# Patient Record
Sex: MALE | Race: WHITE
[De-identification: names, ages, dates, MRNs, and addresses within clinical notes are randomized per-mention and may not be internally consistent; named-entity substitution may affect disease eponyms.]

---

## 2018-09-14 ENCOUNTER — TRANSCRIPTION ENCOUNTER (OUTPATIENT)
Age: 58
End: 2018-09-14

## 2019-02-23 ENCOUNTER — OUTPATIENT (OUTPATIENT)
Dept: OUTPATIENT SERVICES | Facility: HOSPITAL | Age: 59
LOS: 1 days | Discharge: HOME | End: 2019-02-23

## 2019-02-23 DIAGNOSIS — D51.9 VITAMIN B12 DEFICIENCY ANEMIA, UNSPECIFIED: ICD-10-CM

## 2019-02-23 DIAGNOSIS — E13.9 OTHER SPECIFIED DIABETES MELLITUS WITHOUT COMPLICATIONS: ICD-10-CM

## 2019-02-23 DIAGNOSIS — Z00.00 ENCOUNTER FOR GENERAL ADULT MEDICAL EXAMINATION WITHOUT ABNORMAL FINDINGS: ICD-10-CM

## 2019-02-23 DIAGNOSIS — E78.5 HYPERLIPIDEMIA, UNSPECIFIED: ICD-10-CM

## 2019-02-23 DIAGNOSIS — R94.6 ABNORMAL RESULTS OF THYROID FUNCTION STUDIES: ICD-10-CM

## 2019-02-23 DIAGNOSIS — E55.9 VITAMIN D DEFICIENCY, UNSPECIFIED: ICD-10-CM

## 2019-03-13 PROBLEM — Z00.00 ENCOUNTER FOR PREVENTIVE HEALTH EXAMINATION: Status: ACTIVE | Noted: 2019-03-13

## 2019-03-15 ENCOUNTER — APPOINTMENT (OUTPATIENT)
Dept: CARDIOLOGY | Facility: CLINIC | Age: 59
End: 2019-03-15

## 2021-09-27 ENCOUNTER — APPOINTMENT (OUTPATIENT)
Dept: CARDIOLOGY | Facility: CLINIC | Age: 61
End: 2021-09-27

## 2021-10-24 ENCOUNTER — FORM ENCOUNTER (OUTPATIENT)
Age: 61
End: 2021-10-24

## 2021-10-31 ENCOUNTER — FORM ENCOUNTER (OUTPATIENT)
Age: 61
End: 2021-10-31

## 2021-11-01 ENCOUNTER — OUTPATIENT (OUTPATIENT)
Dept: OUTPATIENT SERVICES | Facility: HOSPITAL | Age: 61
LOS: 1 days | Discharge: HOME | End: 2021-11-01

## 2021-11-01 DIAGNOSIS — Z11.59 ENCOUNTER FOR SCREENING FOR OTHER VIRAL DISEASES: ICD-10-CM

## 2021-11-03 ENCOUNTER — FORM ENCOUNTER (OUTPATIENT)
Age: 61
End: 2021-11-03

## 2021-11-04 ENCOUNTER — RESULT REVIEW (OUTPATIENT)
Age: 61
End: 2021-11-04

## 2021-11-04 ENCOUNTER — INPATIENT (INPATIENT)
Facility: HOSPITAL | Age: 61
LOS: 4 days | Discharge: HOME HEALTH | End: 2021-11-09
Attending: INTERNAL MEDICINE | Admitting: INTERNAL MEDICINE
Payer: MEDICAID

## 2021-11-04 VITALS
SYSTOLIC BLOOD PRESSURE: 151 MMHG | OXYGEN SATURATION: 98 % | DIASTOLIC BLOOD PRESSURE: 74 MMHG | RESPIRATION RATE: 18 BRPM | TEMPERATURE: 98 F | HEART RATE: 74 BPM

## 2021-11-04 DIAGNOSIS — Z98.890 OTHER SPECIFIED POSTPROCEDURAL STATES: Chronic | ICD-10-CM

## 2021-11-04 DIAGNOSIS — E87.2 ACIDOSIS: ICD-10-CM

## 2021-11-04 LAB
ALBUMIN SERPL ELPH-MCNC: 4.3 G/DL — SIGNIFICANT CHANGE UP (ref 3.5–5.2)
ALBUMIN SERPL ELPH-MCNC: 4.6 G/DL — SIGNIFICANT CHANGE UP (ref 3.5–5.2)
ALP SERPL-CCNC: 77 U/L — SIGNIFICANT CHANGE UP (ref 30–115)
ALP SERPL-CCNC: 83 U/L — SIGNIFICANT CHANGE UP (ref 30–115)
ALT FLD-CCNC: 17 U/L — SIGNIFICANT CHANGE UP (ref 0–41)
ALT FLD-CCNC: 20 U/L — SIGNIFICANT CHANGE UP (ref 0–41)
ANION GAP SERPL CALC-SCNC: 16 MMOL/L — HIGH (ref 7–14)
ANION GAP SERPL CALC-SCNC: 16 MMOL/L — HIGH (ref 7–14)
APPEARANCE UR: CLEAR — SIGNIFICANT CHANGE UP
APTT BLD: 31.9 SEC — SIGNIFICANT CHANGE UP (ref 27–39.2)
AST SERPL-CCNC: 15 U/L — SIGNIFICANT CHANGE UP (ref 0–41)
AST SERPL-CCNC: 19 U/L — SIGNIFICANT CHANGE UP (ref 0–41)
BILIRUB DIRECT SERPL-MCNC: 0.2 MG/DL — SIGNIFICANT CHANGE UP (ref 0–0.2)
BILIRUB INDIRECT FLD-MCNC: 0.4 MG/DL — SIGNIFICANT CHANGE UP (ref 0.2–1.2)
BILIRUB SERPL-MCNC: 0.6 MG/DL — SIGNIFICANT CHANGE UP (ref 0.2–1.2)
BILIRUB SERPL-MCNC: 0.6 MG/DL — SIGNIFICANT CHANGE UP (ref 0.2–1.2)
BILIRUB UR-MCNC: NEGATIVE — SIGNIFICANT CHANGE UP
BUN SERPL-MCNC: 21 MG/DL — HIGH (ref 10–20)
BUN SERPL-MCNC: 22 MG/DL — HIGH (ref 10–20)
CALCIUM SERPL-MCNC: 9.3 MG/DL — SIGNIFICANT CHANGE UP (ref 8.5–10.1)
CALCIUM SERPL-MCNC: 9.4 MG/DL — SIGNIFICANT CHANGE UP (ref 8.5–10.1)
CHLORIDE SERPL-SCNC: 103 MMOL/L — SIGNIFICANT CHANGE UP (ref 98–110)
CHLORIDE SERPL-SCNC: 104 MMOL/L — SIGNIFICANT CHANGE UP (ref 98–110)
CO2 SERPL-SCNC: 20 MMOL/L — SIGNIFICANT CHANGE UP (ref 17–32)
CO2 SERPL-SCNC: 21 MMOL/L — SIGNIFICANT CHANGE UP (ref 17–32)
COLOR SPEC: SIGNIFICANT CHANGE UP
CREAT SERPL-MCNC: 0.9 MG/DL — SIGNIFICANT CHANGE UP (ref 0.7–1.5)
CREAT SERPL-MCNC: 0.9 MG/DL — SIGNIFICANT CHANGE UP (ref 0.7–1.5)
DIFF PNL FLD: NEGATIVE — SIGNIFICANT CHANGE UP
GLUCOSE BLDC GLUCOMTR-MCNC: 164 MG/DL — HIGH (ref 70–99)
GLUCOSE SERPL-MCNC: 168 MG/DL — HIGH (ref 70–99)
GLUCOSE SERPL-MCNC: 178 MG/DL — HIGH (ref 70–99)
GLUCOSE UR QL: ABNORMAL
HCT VFR BLD CALC: 48.7 % — SIGNIFICANT CHANGE UP (ref 42–52)
HGB BLD-MCNC: 16.5 G/DL — SIGNIFICANT CHANGE UP (ref 14–18)
INR BLD: 1.03 RATIO — SIGNIFICANT CHANGE UP (ref 0.65–1.3)
KETONES UR-MCNC: NEGATIVE — SIGNIFICANT CHANGE UP
LEUKOCYTE ESTERASE UR-ACNC: NEGATIVE — SIGNIFICANT CHANGE UP
MAGNESIUM SERPL-MCNC: 2.2 MG/DL — SIGNIFICANT CHANGE UP (ref 1.8–2.4)
MCHC RBC-ENTMCNC: 29.7 PG — SIGNIFICANT CHANGE UP (ref 27–31)
MCHC RBC-ENTMCNC: 33.9 G/DL — SIGNIFICANT CHANGE UP (ref 32–37)
MCV RBC AUTO: 87.6 FL — SIGNIFICANT CHANGE UP (ref 80–94)
MRSA PCR RESULT.: NEGATIVE — SIGNIFICANT CHANGE UP
NITRITE UR-MCNC: NEGATIVE — SIGNIFICANT CHANGE UP
NRBC # BLD: 0 /100 WBCS — SIGNIFICANT CHANGE UP (ref 0–0)
NT-PROBNP SERPL-SCNC: 183 PG/ML — SIGNIFICANT CHANGE UP (ref 0–300)
PH UR: 5.5 — SIGNIFICANT CHANGE UP (ref 5–8)
PLATELET # BLD AUTO: 194 K/UL — SIGNIFICANT CHANGE UP (ref 130–400)
POTASSIUM SERPL-MCNC: 4.2 MMOL/L — SIGNIFICANT CHANGE UP (ref 3.5–5)
POTASSIUM SERPL-MCNC: 4.4 MMOL/L — SIGNIFICANT CHANGE UP (ref 3.5–5)
POTASSIUM SERPL-SCNC: 4.2 MMOL/L — SIGNIFICANT CHANGE UP (ref 3.5–5)
POTASSIUM SERPL-SCNC: 4.4 MMOL/L — SIGNIFICANT CHANGE UP (ref 3.5–5)
PROT SERPL-MCNC: 6.7 G/DL — SIGNIFICANT CHANGE UP (ref 6–8)
PROT SERPL-MCNC: 7.4 G/DL — SIGNIFICANT CHANGE UP (ref 6–8)
PROT UR-MCNC: NEGATIVE — SIGNIFICANT CHANGE UP
PROTHROM AB SERPL-ACNC: 11.8 SEC — SIGNIFICANT CHANGE UP (ref 9.95–12.87)
RAPID RVP RESULT: SIGNIFICANT CHANGE UP
RBC # BLD: 5.56 M/UL — SIGNIFICANT CHANGE UP (ref 4.7–6.1)
RBC # FLD: 11.9 % — SIGNIFICANT CHANGE UP (ref 11.5–14.5)
SARS-COV-2 RNA SPEC QL NAA+PROBE: SIGNIFICANT CHANGE UP
SODIUM SERPL-SCNC: 140 MMOL/L — SIGNIFICANT CHANGE UP (ref 135–146)
SODIUM SERPL-SCNC: 140 MMOL/L — SIGNIFICANT CHANGE UP (ref 135–146)
SP GR SPEC: 1.05 — HIGH (ref 1.01–1.03)
UROBILINOGEN FLD QL: SIGNIFICANT CHANGE UP
WBC # BLD: 8.77 K/UL — SIGNIFICANT CHANGE UP (ref 4.8–10.8)
WBC # FLD AUTO: 8.77 K/UL — SIGNIFICANT CHANGE UP (ref 4.8–10.8)

## 2021-11-04 PROCEDURE — 94060 EVALUATION OF WHEEZING: CPT | Mod: 26

## 2021-11-04 PROCEDURE — 71250 CT THORAX DX C-: CPT | Mod: 26,ME

## 2021-11-04 PROCEDURE — 93458 L HRT ARTERY/VENTRICLE ANGIO: CPT | Mod: 26

## 2021-11-04 PROCEDURE — 93306 TTE W/DOPPLER COMPLETE: CPT | Mod: 26

## 2021-11-04 PROCEDURE — 99221 1ST HOSP IP/OBS SF/LOW 40: CPT | Mod: 57

## 2021-11-04 PROCEDURE — 93880 EXTRACRANIAL BILAT STUDY: CPT | Mod: 26

## 2021-11-04 PROCEDURE — G1004: CPT

## 2021-11-04 RX ORDER — METFORMIN HYDROCHLORIDE 850 MG/1
0 TABLET ORAL
Qty: 0 | Refills: 0 | DISCHARGE

## 2021-11-04 RX ORDER — METOPROLOL TARTRATE 50 MG
12.5 TABLET ORAL EVERY 12 HOURS
Refills: 0 | Status: DISCONTINUED | OUTPATIENT
Start: 2021-11-04 | End: 2021-11-05

## 2021-11-04 RX ORDER — CHLORHEXIDINE GLUCONATE 213 G/1000ML
1 SOLUTION TOPICAL ONCE
Refills: 0 | Status: COMPLETED | OUTPATIENT
Start: 2021-11-04 | End: 2021-11-04

## 2021-11-04 RX ORDER — ATORVASTATIN CALCIUM 80 MG/1
1 TABLET, FILM COATED ORAL
Qty: 0 | Refills: 0 | DISCHARGE

## 2021-11-04 RX ORDER — ATORVASTATIN CALCIUM 80 MG/1
40 TABLET, FILM COATED ORAL DAILY
Refills: 0 | Status: DISCONTINUED | OUTPATIENT
Start: 2021-11-04 | End: 2021-11-05

## 2021-11-04 RX ORDER — HEPARIN SODIUM 5000 [USP'U]/ML
800 INJECTION INTRAVENOUS; SUBCUTANEOUS
Qty: 25000 | Refills: 0 | Status: DISCONTINUED | OUTPATIENT
Start: 2021-11-04 | End: 2021-11-05

## 2021-11-04 RX ORDER — CHLORHEXIDINE GLUCONATE 213 G/1000ML
15 SOLUTION TOPICAL ONCE
Refills: 0 | Status: COMPLETED | OUTPATIENT
Start: 2021-11-04 | End: 2021-11-05

## 2021-11-04 RX ORDER — SODIUM CHLORIDE 0.65 %
1 AEROSOL, SPRAY (ML) NASAL EVERY 12 HOURS
Refills: 0 | Status: DISCONTINUED | OUTPATIENT
Start: 2021-11-04 | End: 2021-11-05

## 2021-11-04 RX ADMIN — Medication 12.5 MILLIGRAM(S): at 17:07

## 2021-11-04 RX ADMIN — CHLORHEXIDINE GLUCONATE 1 APPLICATION(S): 213 SOLUTION TOPICAL at 22:00

## 2021-11-04 RX ADMIN — HEPARIN SODIUM 8 UNIT(S)/HR: 5000 INJECTION INTRAVENOUS; SUBCUTANEOUS at 18:45

## 2021-11-04 RX ADMIN — ATORVASTATIN CALCIUM 40 MILLIGRAM(S): 80 TABLET, FILM COATED ORAL at 22:44

## 2021-11-04 NOTE — CONSULT NOTE ADULT - TIME BILLING
CTS ATTENDING  pt interviewed and examined  case reviewed with Dr. JENNIFER Rene in the cath lab  angiogram reviewed and shows critical left main disease at the bifurcation with tight ostial circumflex lesion   LAD has a critical lesion as well as the first diagonal  RCA has tandem 99% lesions  there are suitable distal targets for bypass grafting  patien inform of the need for CAB   procedure explained in detail, including risks, benefits and alternatives, and agreed to proceed with CABG tomorrow  40 minutes of patient care as above were utilized to prepare the patient for major surgery  -FMR

## 2021-11-04 NOTE — CHART NOTE - NSCHARTNOTEFT_GEN_A_CORE
PRE-OP DIAGNOSIS:    Suspected CAD  Positive stress test:     PROCEDURE:     [x] Coronary Angiogram   [x] LHC   [x] LVG          PHYSICIAN:  Dr Rene    ASSISTANT:   Dr Dixon       Consent:    [x] Patient      Anesthesia:   [x] Sedation   [x] Local        Access & Closure: Right radial artery & D stat radial band for hemostasis    [x] Fr Radial Artery       IV Contrast:   45      mL        Intervention:   none    Implants: none       FINDINGS:     Coronary Dominance:  right    LM:   severe subtotal distal LM at the bifurcation into the LAD and CFX. It is a Segura 1-1-1 bifurcation  LAD: ostial and proximal LAD with severe 99% stenosis. Mid LAD severe disease.  distal LAD: luminal irregularities.  DG: Luminal irregularities.  CX: ostial/prox: Severe 99% stenosis in the proximal segment. dCFX: smallc caliber vessel. severe disease.  OM1: Large caliber vessel. calcified. has a severe 80% proximally and a 99% stenosis in its distal segment.  RCA: prox: Lunimal irregularities. Mid and distal RCA has severe diffuse 99% stenosis.  RPDA: lum Iregularities.       LVEDP:       EF:   55  %  by echo       ESTIMATED BLOOD LOSS: < 10 mL        CONDITION:   [x] Good        SPECIMEN REMOVED: N/A       POST-OP DIAGNOSIS:      [x] Mild Coronary Artery Disease (< 50% stenosis)   [x] Significant  3 Vessel Coronary Artery Disease (LAD, RCA, LCX) with severe distal LM disease  [x] AUC score: 7      [x] SYNTAX score:    37             PLAN OF CARE:   [] CT sx consult for possible CABG  [] Heart Team for CABG vs Multivessel PCI  [] IV Fluids:   1cc/kg/h x 4h (edp >18)  [] CT Surgery Consult

## 2021-11-04 NOTE — PRE-ANESTHESIA EVALUATION ADULT - NSANTHOSAYNRD_GEN_A_CORE
No. JANIS screening performed.  STOP BANG Legend: 0-2 = LOW Risk; 3-4 = INTERMEDIATE Risk; 5-8 = HIGH Risk

## 2021-11-04 NOTE — H&P CARDIOLOGY - HISTORY OF PRESENT ILLNESS
Patient is a 61y Male PMH: DM    Vital Signs Last 24 Hrs  T(C): --  T(F): --  HR: --  BP: --  BP(mean): --  RR: --  SpO2: --    Pre cath note:  indication:  [ ] STEMI                [ ] NSTEMI                 [ ] Acute coronary syndrome                   [ ]Unstable Angina   [ ] high risk  [ ] intermediate risk  [ ] low risk                   [x ] Stable Angina     non-invasive testing:           stress echo               Date:           10/25/21          result: [ x] high risk  [ ] intermediate risk  [ ] low risk  positive stress echo reveals hypokinesis apical anterior wall, apical septal wall, mid anterior wall, mid inferoseptal wall, basal anterior wall and basal inferoseptal wall    Anti- Anginal medications:                    [x ] not used                       [ ] used                   [ ] not used but strong indication not to use    Ejection Fraction                   [ ] <29            [ ] 30-39%   [ ] 40-49%     [ ]>50%    CHF                   [ ] active (within last 14 days on meds   [ ] Chronic (on meds but no exacerbation)    COPD                   [ ] mild (on chronic bronchodilators)  [ ] moderate (on chronic steroid therapy)      [ ] severe (indication for home O2 or PACO2 >50)    Other risk factors:                     [ ] Previous MI                     [ ] CVA/ stroke                    [ ] carotid stent/ CEA                    [ ] PVD/PAD- (arterial aneurysm, non-palpable pulses, tortuous vessel with inability to insert catheter, infra-renal dissection, renal or subclavian artery stenosis)                    [x ] diabetic                    [ ] previous CABG                    [ ] Renal Failure     Bleeding Risk:1.2%    REVIEW OF SYSTEMS:  CONSTITUTIONAL: No fever, weight loss, or fatigue  CARDIOLOGY: PAtient denies chest pain, shortness of breath or syncopal episodes.   RESPIRATORY: denies shortness of breath, wheezing  NEUROLOGICAL: NO weakness, no focal deficits to report.  ENDOCRINOLOGICAL: no recent change in diabetic medications.   GI: no BRBPR, no N,V,diarrhea.     PHYSICAL EXAM:  · CONSTITUTIONAL:	Well-developed, well nourished    ·RESPIRATORY:   airway patent; breath sounds equal; good air movement; respirations non-labored; clear to auscultation bilaterally; no chest wall tenderness; no intercostal retractions; no rales,rhonchi or wheeze  · CARDIOVASCULAR	regular rate and rhythm  no rub  no murmur  normal PMI  · EXTREMITIES: No cyanosis, clubbing or edema  · VASCULAR: 	Equal and normal pulses (carotid, femoral, dorsalis pedis)  	        EKG: SR Patient is a 61y Male PMH: DM, HLD, + FH MI. Pt reports intermittent CP and SOB with activity over past few months , had positive stress echo reveals hypokinesis apical anterior wall, apical septal wall, mid anterior wall, mid inferoseptal wall, basal anterior wall and basal inferoseptal wall, [facility] recommended        Vital Signs Last 24 Hrs  T(C): --  T(F): --  HR: --72  BP: --169/88  BP(mean): --  RR: --  SpO2: --100% RA    Pre cath note:  indication:  [ ] STEMI                [ ] NSTEMI                 [ ] Acute coronary syndrome                   [ ]Unstable Angina   [ ] high risk  [ ] intermediate risk  [ ] low risk                   [x ] Stable Angina     non-invasive testing:           stress echo               Date:           10/25/21          result: [ x] high risk  [ ] intermediate risk  [ ] low risk  positive stress echo reveals hypokinesis apical anterior wall, apical septal wall, mid anterior wall, mid inferoseptal wall, basal anterior wall and basal inferoseptal wall    Anti- Anginal medications:                    [x ] not used                       [ ] used                   [ ] not used but strong indication not to use    Ejection Fraction                   [ ] <29            [ ] 30-39%   [ ] 40-49%     [ ]>50%    CHF                   [ ] active (within last 14 days on meds   [ ] Chronic (on meds but no exacerbation)    COPD                   [ ] mild (on chronic bronchodilators)  [ ] moderate (on chronic steroid therapy)      [ ] severe (indication for home O2 or PACO2 >50)    Other risk factors:                     [ ] Previous MI                     [ ] CVA/ stroke                    [ ] carotid stent/ CEA                    [ ] PVD/PAD- (arterial aneurysm, non-palpable pulses, tortuous vessel with inability to insert catheter, infra-renal dissection, renal or subclavian artery stenosis)                    [x ] diabetic                    [ ] previous CABG                    [ ] Renal Failure     Bleeding Risk:1.2%    REVIEW OF SYSTEMS:  CONSTITUTIONAL: No fever, weight loss, or fatigue  CARDIOLOGY: PAtient denies chest pain, shortness of breath or syncopal episodes.   RESPIRATORY: denies shortness of breath, wheezing  NEUROLOGICAL: NO weakness, no focal deficits to report.  ENDOCRINOLOGICAL: no recent change in diabetic medications.   GI: no BRBPR, no N,V,diarrhea.     PHYSICAL EXAM:  · CONSTITUTIONAL:	Well-developed, well nourished    ·RESPIRATORY:   airway patent; breath sounds equal; good air movement; respirations non-labored; clear to auscultation bilaterally; no chest wall tenderness; no intercostal retractions; no rales,rhonchi or wheeze  · CARDIOVASCULAR	regular rate and rhythm  no rub  no murmur  normal PMI  · EXTREMITIES: No cyanosis, clubbing or edema  · VASCULAR: 	Equal and normal pulses (carotid, femoral, dorsalis pedis)  	        EKG: SR

## 2021-11-04 NOTE — CONSULT NOTE ADULT - SUBJECTIVE AND OBJECTIVE BOX
Surgeon: Dr. Goddard    Consult requesting by: Dr. Mike Rene  PMD: Dr. Juan Sosa      HISTORY OF PRESENT ILLNESS: Patient is a 60y/o Male PMH: DM, HLD, + FH MI (Father  at 65 yoa from MI). Pt reports intermittent CP and SOB with activity over past few months, had positive stress echo reveals diffuse hypokinesis of apical and septal walls. Patient presented for elective LHC which revealed 3vCAD w/LM disease.     NYHA functional class    [ ] Class I (no limitation) [x ] Class II (slight limitation) [ ] Class III (marked limitation) [ ] Class IV (symptoms at rest)    PAST MEDICAL & SURGICAL HISTORY:  DM (diabetes mellitus)  HLD (hyperlipidemia)  History of tonsillectomy and adenoidectomy    Home Medications:  Aspir 81 oral delayed release tablet: 1 tab(s) orally once a day (2021 09:13)  atorvastatin 40 mg oral tablet: 1 tab(s) orally once a day (2021 09:13)  metFORMIN 500 mg oral tablet: orally 3 times a day (2021 09:13)  Multiple Vitamins oral capsule: 1 cap(s) orally once a day (2021 09:13)    Antiplatelet therapy:  N/A                         Last dose/amt:      Allergies  No Known Allergies  Intolerances      SOCIAL HISTORY:  Smoker: [ ] Yes  [x ] No        PACK YEARS:                         WHEN QUIT?  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, son  Assisted device use: N/A  5 meter walk test: 1__5.5__sec, 2_5.5___sec, 3_5.5__sec  FAMILY HISTORY:  FH: CAD (coronary artery disease), MI/ at 65 yoa (Father)      Review of Systems  CONSTITUTIONAL:  Fevers[ ] chills[ ] sweats[ ] fatigue[ ] weight loss[ ] weight gain [ ]                                     NEGATIVE [X ]   NEURO:  paresthesias[ ] seizures [ ]  syncope [ ]  confusion [ ]                                                                                NEGATIVE[ X]   EYES: glasses[ ]  blurry vision[ ]  discharge[ ] pain[ ] glaucoma [ ]                                                                          NEGATIVE[X ]   ENMT:  difficulty hearing [ ]  vertigo[ ]  dysphagia[ ] epistaxis[ ] recent dental work [ ]                                    NEGATIVE[ X]   CV:  chest pain[ x] palpitations[ ] CRUZ [ ] diaphoresis [ ]                                                                                          RESPIRATORY:  wheezing[ ] SOB[x ] cough [ ] sputum[ ] hemoptysis[ ]                                                                     GI:  nausea[ ]  vomiting [ ]  diarrhea[ ] constipation [ ] melena [ ]                                                                         NEGATIVE[ X]   : hematuria[ ]  dysuria[ ] urgency[ ] incontinence[ ]                                                                                            NEGATIVE[ X]   MUSKULOSKELETAL:  arthritis[ ]  joint swelling [ ] muscle weakness [ ] Hx vein stripping [ ]                             NEGATIVE[X ]   SKIN/BREAST:  rash[ ] itching [ ]  hair loss[ ] masses[ ]                                                                                              NEGATIVE[ X]   PSYCH:  dementia [ ] depression [ ] anxiety[ ]                                                                                                               NEGATIVE[X ]   HEME/LYMPH:  bruises easily[ ] enlarged lymph nodes[ ] tender lymph nodes[ ]                                               NEGATIVE[ X]   ENDOCRINE:  cold intolerance[ ] heat intolerance[ ] polydipsia[ ]                                                                          NEGATIVE[ X]     PHYSICAL EXAM    CONSTITUTIONAL:  WNL[x ]   Neuro: WNL [ x] Normal exam oriented to person/place & time with no focal motor or sensory  deficits. Other                     Eyes:    WNL [ x] Normal exam of conjunctiva & lids, pupils equally reactive. Other     ENT:     WNL [x ] Normal exam of nasal/oral mucosa with absence of cyanosis. Other  Neck:   WNL [x ] Normal exam of jugular veins, trachea & thyroid. Other  Chest:  WNL [x ] Normal lung exam with good air movement absence of wheezes, rales, or rhonchi: Other                                                                                CV:  Auscultation: normal [ x] S3[ ] S4[ ] Irregular [ ] Rub[ ] Clicks[ ]    Murmurs none:[x ]systolic [ ]  diastolic [ ] holosystolic [ ]  Carotids: No Bruits[x ] Other                  Abdominal Aorta: normal [x ] nonpalpable[ ]Other                                                                                      GI: WNL[x ] Normal exam of abdomen, liver & spleen with no noted masses or tenderness. Other                                                                                                        Extremities: WNL[x ] Normal no evidence of cyanosis or deformity Edema: none[ ]trace[ ]1+[ ]2+[ ]3+[ ]4+[ ]  Lower Extremity Pulses: 1+ b/l pedal pulses.                                                             LABS:                        16.5   8.77  )-----------( 194      ( 2021 08:48 )             48.7     11-04    140  |  103  |  21<H>  ----------------------------<  178<H>  4.2   |  21  |  0.9    Ca    9.3      2021 08:48    COVID Result:     Cardiac Cath:    TTE / NAUN:    STS Score:     Impression:  CAD [ ]  Valvular  disease [ ]   Aortic Disease [ ]   DINH: Yes[ ] No [ ]   CKD Stage I [ ] , Stage II [ ] , Stage III [ ], Stage IV [ ]   Anemia: Yes [ ], No [ ]  Diabetes :Yes [ ], No [ ]  Acute MI: Yes [ ], No [ ]   Heart Failure: Yes [ ] , No [ ] HFpEF [ ], HFrEF [ ]      Assessment/ Plan: 61y Male with...  -Case and plan discussed with CT surgeon /Nitza/Bakari. Initial STS risk assessed and discussed with patient. Evaluation by full heart team pending. Attending note to follow. Pre-op for:     Recommendations:  [] hold Plavix  [] hold ASA if Pre-op Cardiac Valve surgery and patient without CAD  [] hold ACEI/ARB/CCB 24 hours prior to planned procedure   [] LUE/RUE precaution for possible radial artery harvest      Labs:  [] CBC  [] CMP  [] PT/INR/PTT  [] BNP  [] HgA1c  [] Type and screen  [] Urinalysis  [] MRSA  [] COVID pcr    Diagnostic studies  [] CT HEAD Non-Contrast  [] CT Chest without/with contrast   [] Carotid Duplex  [] PFT: Simple PFT [ ]  Full [ ]  [] RADHA/PVR  [] TTE    Consultations/Evaluations   [] Renal Consult  [] Pulmonary Consult  [] Vascular Consult  [] Dental Consult   [] Hem-Onc Consult   [] GI Consult   [] Other Consultations :                 Surgeon: Dr. Goddard    Consult requesting by: Dr. Mike Rene  PMD: Dr. Juan Sosa      HISTORY OF PRESENT ILLNESS: Patient is a 60y/o Male PMH: DM, HLD, + FH MI (Father  at 65 yoa from [place]). Pt reports intermittent CP and SOB with activity over past few months, had positive stress echo reveals diffuse hypokinesis of apical and septal walls. Patient presented for elective LHC which revealed 3vCAD w/LM disease.     NYHA functional class    [ ] Class I (no limitation) [x ] Class II (slight limitation) [ ] Class III (marked limitation) [ ] Class IV (symptoms at rest)    PAST MEDICAL & SURGICAL HISTORY:  DM (diabetes mellitus)  HLD (hyperlipidemia)  History of tonsillectomy and adenoidectomy    Home Medications:  Aspir 81 oral delayed release tablet: 1 tab(s) orally once a day (2021 09:13)  atorvastatin 40 mg oral tablet: 1 tab(s) orally once a day (2021 09:13)  metFORMIN 500 mg oral tablet: orally 3 times a day (2021 09:13)  Multiple Vitamins oral capsule: 1 cap(s) orally once a day (2021 09:13)    Antiplatelet therapy:  N/A                         Last dose/amt:      Allergies  No Known Allergies  Intolerances      SOCIAL HISTORY:  Smoker: [ ] Yes  [x ] No        PACK YEARS:                         WHEN QUIT?  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, son  Assisted device use: N/A  5 meter walk test: 1__5.5__sec, 2_5.5___sec, 3_5.5__sec  FAMILY HISTORY:  FH: CAD (coronary artery disease), MI/ at 65 yoa (Father)      Review of Systems  CONSTITUTIONAL:  Fevers[ ] chills[ ] sweats[ ] fatigue[ ] weight loss[ ] weight gain [ ]                                     NEGATIVE [X ]   NEURO:  paresthesias[ ] seizures [ ]  syncope [ ]  confusion [ ]                                                                                NEGATIVE[ X]   EYES: glasses[ ]  blurry vision[ ]  discharge[ ] pain[ ] glaucoma [ ]                                                                          NEGATIVE[X ]   ENMT:  difficulty hearing [ ]  vertigo[ ]  dysphagia[ ] epistaxis[ ] recent dental work [ ]                                    NEGATIVE[ X]   CV:  chest pain[ x] palpitations[ ] CRUZ [ ] diaphoresis [ ]                                                                                          RESPIRATORY:  wheezing[ ] SOB[x ] cough [ ] sputum[ ] hemoptysis[ ]                                                                     GI:  nausea[ ]  vomiting [ ]  diarrhea[ ] constipation [ ] melena [ ]                                                                         NEGATIVE[ X]   : hematuria[ ]  dysuria[ ] urgency[ ] incontinence[ ]                                                                                            NEGATIVE[ X]   MUSKULOSKELETAL:  arthritis[ ]  joint swelling [ ] muscle weakness [ ] Hx vein stripping [ ]                             NEGATIVE[X ]   SKIN/BREAST:  rash[ ] itching [ ]  hair loss[ ] masses[ ]                                                                                              NEGATIVE[ X]   PSYCH:  dementia [ ] depression [ ] anxiety[ ]                                                                                                               NEGATIVE[X ]   HEME/LYMPH:  bruises easily[ ] enlarged lymph nodes[ ] tender lymph nodes[ ]                                               NEGATIVE[ X]   ENDOCRINE:  cold intolerance[ ] heat intolerance[ ] polydipsia[ ]                                                                          NEGATIVE[ X]     PHYSICAL EXAM    CONSTITUTIONAL:  WNL[x ]   Neuro: WNL [ x] Normal exam oriented to person/place & time with no focal motor or sensory  deficits. Other                     Eyes:    WNL [ x] Normal exam of conjunctiva & lids, pupils equally reactive. Other     ENT:     WNL [x ] Normal exam of nasal/oral mucosa with absence of cyanosis. Other  Neck:   WNL [x ] Normal exam of jugular veins, trachea & thyroid. Other  Chest:  WNL [x ] Normal lung exam with good air movement absence of wheezes, rales, or rhonchi: Other                                                                                CV:  Auscultation: normal [ x] S3[ ] S4[ ] Irregular [ ] Rub[ ] Clicks[ ]    Murmurs none:[x ]systolic [ ]  diastolic [ ] holosystolic [ ]  Carotids: No Bruits[x ] Other                  Abdominal Aorta: normal [x ] nonpalpable[ ]Other                                                                                      GI: WNL[x ] Normal exam of abdomen, liver & spleen with no noted masses or tenderness. Other                                                                                                        Extremities: WNL[x ] Normal no evidence of cyanosis or deformity Edema: none[ ]trace[ ]1+[ ]2+[ ]3+[ ]4+[ ]  Lower Extremity Pulses: 1+ b/l pedal pulses.                                                             LABS:                        16.5   8.77  )-----------( 194      ( 2021 08:48 )             48.7     11-04    140  |  103  |  21<H>  ----------------------------<  178<H>  4.2   |  21  |  0.9    Ca    9.3      2021 08:48    COVID Result: Negative (21)    Cardiac Cath: FINDINGS:     Coronary Dominance:  right    LM:   severe subtotal distal LM at the bifurcation into the LAD and CFX. It is a Segura 1-1-1 bifurcation  LAD: ostial and proximal LAD with severe 99% stenosis. Mid LAD severe disease.  distal LAD: luminal irregularities.  DG: Luminal irregularities.  CX: ostial/prox: Severe 99% stenosis in the proximal segment. dCFX: small  caliber vessel. severe disease.  OM1: Large caliber vessel. calcified. has a severe 80% proximally and a 99% stenosis in its distal segment.  RCA: prox: Luminal irregularities. Mid and distal RCA has severe diffuse 99% stenosis.  RPDA: luminal Irregularities    EF:   55  %  by echo      TTE: PENDING    CT CHEST: PENDING    Carotid dupelx: PENDING    PFTs: PENDING     STS Score:  Isolated CAB  Risk of Mortality: 0.897%  Renal Failure: 1.360%  Permanent Stroke:0.580%  Prolonged Ventilation:4.734%  DSW Infection:0.203%  Reoperation:1.509%  Morbidity or Mortality:7.795%  Short Length of Stay:53.760%  Long Length of Stay:3.119%    Impression:  CAD [ x]  Valvular  disease [ ]   Aortic Disease [ ]   DINH: Yes[ ] No [ ]   CKD Stage I [ ] , Stage II [ ] , Stage III [ ], Stage IV [ ]   Anemia: Yes [ ], No [ ]  Diabetes :Yes [x ], No [ ]  Acute MI: Yes [ ], No [ ]   Heart Failure: Yes [ ] , No [ ] HFpEF [ ], HFrEF [ ]      Assessment/ Plan: Patient is a 60y/o Male PMH: DM, HLD, + FH MI (Father  at 65 yoa from [place]). Pt reports intermittent CP and SOB with activity over past few months, had positive stress echo reveals diffuse hypokinesis of apical and septal walls. Patient presented for elective LHC which revealed 3vCAD w/LM disease.   -Case and plan discussed with CT surgeon Dr. Goddard. Initial STS risk assessed and discussed with patient. Evaluation by full heart team pending. Attending note to follow. Pre-op for: CABG    Recommendations:  [x] hold Plavix  [] hold ASA if Pre-op Cardiac Valve surgery and patient without CAD  [] hold ACEI/ARB/CCB 24 hours prior to planned procedure   [x] LUE precaution for possible radial artery harvest      Labs:  [] CBC  [] CMP  [] PT/INR/PTT  [x] BNP  [x] HgA1c  [x] Type and screen  [x] Urinalysis  [x] MRSA  [x] COVID pcr    Diagnostic studies  [] CT HEAD Non-Contrast  [x] CT Chest without contrast   [x] Carotid Duplex  [] PFT: Simple PFT [ x]  Full [ ]  [] RADHA/PVR  [x] TTE    Consultations/Evaluations   [] Renal Consult  [] Pulmonary Consult  [] Vascular Consult  [] Dental Consult   [] Hem-Onc Consult   [] GI Consult   [] Other Consultations :                 Surgeon: Dr. Goddard    Consult requesting by: Dr. Mike Rene  PMD: Dr. Juan Sosa      HISTORY OF PRESENT ILLNESS: Patient is a 62y/o Male PMH: DM, HLD, + FH MI (Father  at 65 yoa from [place]). Pt reports intermittent CP and SOB with activity over past few months, had positive stress echo reveals diffuse hypokinesis of apical and septal walls. Patient presented for elective LHC which revealed 3vCAD w/LM disease.     NYHA functional class    [ ] Class I (no limitation) [x ] Class II (slight limitation) [ ] Class III (marked limitation) [ ] Class IV (symptoms at rest)    PAST MEDICAL & SURGICAL HISTORY:  DM (diabetes mellitus)  HLD (hyperlipidemia)  History of tonsillectomy and adenoidectomy    Home Medications:  Aspir 81 oral delayed release tablet: 1 tab(s) orally once a day (2021 09:13)  atorvastatin 40 mg oral tablet: 1 tab(s) orally once a day (2021 09:13)  metFORMIN 500 mg oral tablet: orally 3 times a day (2021 09:13)  Multiple Vitamins oral capsule: 1 cap(s) orally once a day (2021 09:13)    Antiplatelet therapy:  N/A                         Last dose/amt:      Allergies  No Known Allergies  Intolerances      SOCIAL HISTORY:  Smoker: [ ] Yes  [x ] No        PACK YEARS:                         WHEN QUIT?  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, son  Assisted device use: N/A  5 meter walk test: 1__5.5__sec, 2_5.5___sec, 3_5.5__sec  FAMILY HISTORY:  FH: CAD (coronary artery disease), MI/ at 65 yoa (Father)      Review of Systems  CONSTITUTIONAL:  Fevers[ ] chills[ ] sweats[ ] fatigue[ ] weight loss[ ] weight gain [ ]                                     NEGATIVE [X ]   NEURO:  paresthesias[ ] seizures [ ]  syncope [ ]  confusion [ ]                                                                                NEGATIVE[ X]   EYES: glasses[ ]  blurry vision[ ]  discharge[ ] pain[ ] glaucoma [ ]                                                                          NEGATIVE[X ]   ENMT:  difficulty hearing [ ]  vertigo[ ]  dysphagia[ ] epistaxis[ ] recent dental work [ ]                                    NEGATIVE[ X]   CV:  chest pain[ x] palpitations[ ] CRUZ [ ] diaphoresis [ ]                                                                                          RESPIRATORY:  wheezing[ ] SOB[x ] cough [ ] sputum[ ] hemoptysis[ ]                                                                     GI:  nausea[ ]  vomiting [ ]  diarrhea[ ] constipation [ ] melena [ ]                                                                         NEGATIVE[ X]   : hematuria[ ]  dysuria[ ] urgency[ ] incontinence[ ]                                                                                            NEGATIVE[ X]   MUSKULOSKELETAL:  arthritis[ ]  joint swelling [ ] muscle weakness [ ] Hx vein stripping [ ]                             NEGATIVE[X ]   SKIN/BREAST:  rash[ ] itching [ ]  hair loss[ ] masses[ ]                                                                                              NEGATIVE[ X]   PSYCH:  dementia [ ] depression [ ] anxiety[ ]                                                                                                               NEGATIVE[X ]   HEME/LYMPH:  bruises easily[ ] enlarged lymph nodes[ ] tender lymph nodes[ ]                                               NEGATIVE[ X]   ENDOCRINE:  cold intolerance[ ] heat intolerance[ ] polydipsia[ ]                                                                          NEGATIVE[ X]     PHYSICAL EXAM    CONSTITUTIONAL:  WNL[x ]   Neuro: WNL [ x] Normal exam oriented to person/place & time with no focal motor or sensory  deficits. Other                     Eyes:    WNL [ x] Normal exam of conjunctiva & lids, pupils equally reactive. Other     ENT:     WNL [x ] Normal exam of nasal/oral mucosa with absence of cyanosis. Other  Neck:   WNL [x ] Normal exam of jugular veins, trachea & thyroid. Other  Chest:  WNL [x ] Normal lung exam with good air movement absence of wheezes, rales, or rhonchi: Other                                                                                CV:  Auscultation: normal [ x] S3[ ] S4[ ] Irregular [ ] Rub[ ] Clicks[ ]    Murmurs none:[x ]systolic [ ]  diastolic [ ] holosystolic [ ]  Carotids: No Bruits[x ] Other                  Abdominal Aorta: normal [x ] nonpalpable[ ]Other                                                                                      GI: WNL[x ] Normal exam of abdomen, liver & spleen with no noted masses or tenderness. Other                                                                                                        Extremities: WNL[x ] Normal no evidence of cyanosis or deformity Edema: none[ ]trace[ ]1+[ ]2+[ ]3+[ ]4+[ ]  Lower Extremity Pulses: 1+ b/l pedal pulses.                                                             LABS:                        16.5   8.77  )-----------( 194      ( 2021 08:48 )             48.7     11-04    140  |  103  |  21<H>  ----------------------------<  178<H>  4.2   |  21  |  0.9    Ca    9.3      2021 08:48    COVID Result: Negative (21)    Cardiac Cath: FINDINGS:     Coronary Dominance:  right    LM:   severe subtotal distal LM at the bifurcation into the LAD and CFX. It is a Segura 1-1-1 bifurcation  LAD: ostial and proximal LAD with severe 99% stenosis. Mid LAD severe disease.  distal LAD: luminal irregularities.  DG: Luminal irregularities.  CX: ostial/prox: Severe 99% stenosis in the proximal segment. dCFX: small  caliber vessel. severe disease.  OM1: Large caliber vessel. calcified. has a severe 80% proximally and a 99% stenosis in its distal segment.  RCA: prox: Luminal irregularities. Mid and distal RCA has severe diffuse 99% stenosis.  RPDA: luminal Irregularities    EF:   55  %  by echo      TTE: PENDING    CT CHEST: PENDING    Carotid dupelx: PENDING    PFTs: PENDING     STS Score:  Isolated CAB  Risk of Mortality: 0.897%  Renal Failure: 1.360%  Permanent Stroke:0.580%  Prolonged Ventilation:4.734%  DSW Infection:0.203%  Reoperation:1.509%  Morbidity or Mortality:7.795%  Short Length of Stay:53.760%  Long Length of Stay:3.119%    Impression:  CAD [ x]  Valvular  disease [ ]   Aortic Disease [ ]   DINH: Yes[ ] No [ ]   CKD Stage I [ ] , Stage II [ ] , Stage III [ ], Stage IV [ ]   Anemia: Yes [ ], No [ ]  Diabetes :Yes [x ], No [ ]  Acute MI: Yes [ ], No [ ]   Heart Failure: Yes [ ] , No [ ] HFpEF [ ], HFrEF [ ]      Assessment/ Plan: Patient is a 62y/o Male PMH: DM, HLD, + FH MI (Father  at 65 yoa from [place]). Pt reports intermittent CP and SOB with activity over past few months, had positive stress echo reveals diffuse hypokinesis of apical and septal walls. Patient presented for elective LHC which revealed 3vCAD w/LM disease.   -Case and plan discussed with CT surgeon Dr. Goddard. Initial STS risk assessed and discussed with patient. Evaluation by full heart team pending. Attending note to follow. Pre-op for: CABG    Recommendations:  [x] hold Plavix  [] hold ASA if Pre-op Cardiac Valve surgery and patient without CAD  [] hold ACEI/ARB/CCB 24 hours prior to planned procedure   [x] LUE precaution for possible radial artery harvest      Labs:  [] CBC  [] CMP  [] PT/INR/PTT  [x] BNP  [x] HgA1c  [x] Type and screen  [x] Urinalysis  [x] MRSA  [x] COVID pcr    Diagnostic studies  [] CT HEAD Non-Contrast  [x] CT Chest without contrast   [x] Carotid Duplex  [] PFT: Simple PFT [ x]  Full [ ]  [] RADHA/PVR  [x] TTE    Consultations/Evaluations   [] Renal Consult  [] Pulmonary Consult  [] Vascular Consult  [] Dental Consult   [] Hem-Onc Consult   [] GI Consult   [] Other Consultations :    CTS ATTENDING  pt interviewed and examined  case reviewed with Dr. JENNIFER Rene in the cath lab  angiogram reviewed and shows critical left main disease at the bifurcation with tight ostial circumflex lesion   LAD has a critical lesion as well as the first diagonal  RCA has tandem 99% lesions  there are suitable distal targets for bypass grafting  patien inform of the need for CAB   procedure explained in detail, including risks, benefits and alternatives, and agreed to proceed with CABG tomorrow  40 minutes of patient care as above were utilized to prepare the patient for major surgery  -FMR

## 2021-11-05 ENCOUNTER — TRANSCRIPTION ENCOUNTER (OUTPATIENT)
Age: 61
End: 2021-11-05

## 2021-11-05 LAB
A1C WITH ESTIMATED AVERAGE GLUCOSE RESULT: 7.2 % — HIGH (ref 4–5.6)
ABO RH CONFIRMATION: SIGNIFICANT CHANGE UP
ALBUMIN SERPL ELPH-MCNC: 4.1 G/DL — SIGNIFICANT CHANGE UP (ref 3.5–5.2)
ALP SERPL-CCNC: 37 U/L — SIGNIFICANT CHANGE UP (ref 30–115)
ALT FLD-CCNC: 10 U/L — SIGNIFICANT CHANGE UP (ref 0–41)
ANION GAP SERPL CALC-SCNC: 18 MMOL/L — HIGH (ref 7–14)
APTT BLD: 24.1 SEC — LOW (ref 27–39.2)
APTT BLD: 32.7 SEC — SIGNIFICANT CHANGE UP (ref 27–39.2)
AST SERPL-CCNC: 13 U/L — SIGNIFICANT CHANGE UP (ref 0–41)
BASOPHILS # BLD AUTO: 0.04 K/UL — SIGNIFICANT CHANGE UP (ref 0–0.2)
BASOPHILS NFR BLD AUTO: 0.3 % — SIGNIFICANT CHANGE UP (ref 0–1)
BILIRUB SERPL-MCNC: 0.9 MG/DL — SIGNIFICANT CHANGE UP (ref 0.2–1.2)
BLD GP AB SCN SERPL QL: SIGNIFICANT CHANGE UP
BUN SERPL-MCNC: 13 MG/DL — SIGNIFICANT CHANGE UP (ref 10–20)
CALCIUM SERPL-MCNC: 7.4 MG/DL — LOW (ref 8.5–10.1)
CHLORIDE SERPL-SCNC: 107 MMOL/L — SIGNIFICANT CHANGE UP (ref 98–110)
CO2 SERPL-SCNC: 16 MMOL/L — LOW (ref 17–32)
CREAT SERPL-MCNC: 0.6 MG/DL — LOW (ref 0.7–1.5)
EOSINOPHIL # BLD AUTO: 0.05 K/UL — SIGNIFICANT CHANGE UP (ref 0–0.7)
EOSINOPHIL NFR BLD AUTO: 0.3 % — SIGNIFICANT CHANGE UP (ref 0–8)
ESTIMATED AVERAGE GLUCOSE: 160 MG/DL — HIGH (ref 68–114)
GAS PNL BLDA: SIGNIFICANT CHANGE UP
GLUCOSE BLDC GLUCOMTR-MCNC: 100 MG/DL — HIGH (ref 70–99)
GLUCOSE BLDC GLUCOMTR-MCNC: 121 MG/DL — HIGH (ref 70–99)
GLUCOSE BLDC GLUCOMTR-MCNC: 122 MG/DL — HIGH (ref 70–99)
GLUCOSE BLDC GLUCOMTR-MCNC: 124 MG/DL — HIGH (ref 70–99)
GLUCOSE BLDC GLUCOMTR-MCNC: 145 MG/DL — HIGH (ref 70–99)
GLUCOSE BLDC GLUCOMTR-MCNC: 146 MG/DL — HIGH (ref 70–99)
GLUCOSE BLDC GLUCOMTR-MCNC: 149 MG/DL — HIGH (ref 70–99)
GLUCOSE SERPL-MCNC: 130 MG/DL — HIGH (ref 70–99)
HCT VFR BLD CALC: 29.8 % — LOW (ref 42–52)
HCT VFR BLD CALC: 47.9 % — SIGNIFICANT CHANGE UP (ref 42–52)
HGB BLD-MCNC: 10.1 G/DL — LOW (ref 14–18)
HGB BLD-MCNC: 16.1 G/DL — SIGNIFICANT CHANGE UP (ref 14–18)
IMM GRANULOCYTES NFR BLD AUTO: 0.5 % — HIGH (ref 0.1–0.3)
INR BLD: 1.03 RATIO — SIGNIFICANT CHANGE UP (ref 0.65–1.3)
INR BLD: 1.3 RATIO — SIGNIFICANT CHANGE UP (ref 0.65–1.3)
LYMPHOCYTES # BLD AUTO: 16.8 % — LOW (ref 20.5–51.1)
LYMPHOCYTES # BLD AUTO: 2.58 K/UL — SIGNIFICANT CHANGE UP (ref 1.2–3.4)
MAGNESIUM SERPL-MCNC: 2.8 MG/DL — HIGH (ref 1.8–2.4)
MCHC RBC-ENTMCNC: 29.6 PG — SIGNIFICANT CHANGE UP (ref 27–31)
MCHC RBC-ENTMCNC: 29.8 PG — SIGNIFICANT CHANGE UP (ref 27–31)
MCHC RBC-ENTMCNC: 33.6 G/DL — SIGNIFICANT CHANGE UP (ref 32–37)
MCHC RBC-ENTMCNC: 33.9 G/DL — SIGNIFICANT CHANGE UP (ref 32–37)
MCV RBC AUTO: 87.4 FL — SIGNIFICANT CHANGE UP (ref 80–94)
MCV RBC AUTO: 88.5 FL — SIGNIFICANT CHANGE UP (ref 80–94)
MONOCYTES # BLD AUTO: 1.02 K/UL — HIGH (ref 0.1–0.6)
MONOCYTES NFR BLD AUTO: 6.6 % — SIGNIFICANT CHANGE UP (ref 1.7–9.3)
NEUTROPHILS # BLD AUTO: 11.59 K/UL — HIGH (ref 1.4–6.5)
NEUTROPHILS NFR BLD AUTO: 75.5 % — HIGH (ref 42.2–75.2)
NRBC # BLD: 0 /100 WBCS — SIGNIFICANT CHANGE UP (ref 0–0)
NRBC # BLD: 0 /100 WBCS — SIGNIFICANT CHANGE UP (ref 0–0)
PLATELET # BLD AUTO: 133 K/UL — SIGNIFICANT CHANGE UP (ref 130–400)
PLATELET # BLD AUTO: 218 K/UL — SIGNIFICANT CHANGE UP (ref 130–400)
POTASSIUM SERPL-MCNC: 4.5 MMOL/L — SIGNIFICANT CHANGE UP (ref 3.5–5)
POTASSIUM SERPL-SCNC: 4.5 MMOL/L — SIGNIFICANT CHANGE UP (ref 3.5–5)
PROT SERPL-MCNC: 5.3 G/DL — LOW (ref 6–8)
PROTHROM AB SERPL-ACNC: 11.8 SEC — SIGNIFICANT CHANGE UP (ref 9.95–12.87)
PROTHROM AB SERPL-ACNC: 14.9 SEC — HIGH (ref 9.95–12.87)
RBC # BLD: 3.41 M/UL — LOW (ref 4.7–6.1)
RBC # BLD: 5.41 M/UL — SIGNIFICANT CHANGE UP (ref 4.7–6.1)
RBC # FLD: 11.8 % — SIGNIFICANT CHANGE UP (ref 11.5–14.5)
RBC # FLD: 12 % — SIGNIFICANT CHANGE UP (ref 11.5–14.5)
SODIUM SERPL-SCNC: 141 MMOL/L — SIGNIFICANT CHANGE UP (ref 135–146)
WBC # BLD: 10.71 K/UL — SIGNIFICANT CHANGE UP (ref 4.8–10.8)
WBC # BLD: 15.36 K/UL — HIGH (ref 4.8–10.8)
WBC # FLD AUTO: 10.71 K/UL — SIGNIFICANT CHANGE UP (ref 4.8–10.8)
WBC # FLD AUTO: 15.36 K/UL — HIGH (ref 4.8–10.8)

## 2021-11-05 PROCEDURE — 33534 CABG ARTERIAL TWO: CPT

## 2021-11-05 PROCEDURE — 33519 CABG ARTERY-VEIN THREE: CPT

## 2021-11-05 PROCEDURE — 71045 X-RAY EXAM CHEST 1 VIEW: CPT | Mod: 26,77

## 2021-11-05 PROCEDURE — 93010 ELECTROCARDIOGRAM REPORT: CPT

## 2021-11-05 PROCEDURE — 71045 X-RAY EXAM CHEST 1 VIEW: CPT | Mod: 26

## 2021-11-05 PROCEDURE — 33508 ENDOSCOPIC VEIN HARVEST: CPT | Mod: 59

## 2021-11-05 PROCEDURE — 35600 OPEN HRV UXTR ART 1 SGM CAB: CPT | Mod: LT

## 2021-11-05 RX ORDER — ONDANSETRON 8 MG/1
4 TABLET, FILM COATED ORAL ONCE
Refills: 0 | Status: COMPLETED | OUTPATIENT
Start: 2021-11-05 | End: 2021-11-08

## 2021-11-05 RX ORDER — SENNA PLUS 8.6 MG/1
2 TABLET ORAL AT BEDTIME
Refills: 0 | Status: DISCONTINUED | OUTPATIENT
Start: 2021-11-06 | End: 2021-11-09

## 2021-11-05 RX ORDER — CHLORHEXIDINE GLUCONATE 213 G/1000ML
1 SOLUTION TOPICAL
Refills: 0 | Status: DISCONTINUED | OUTPATIENT
Start: 2021-11-05 | End: 2021-11-09

## 2021-11-05 RX ORDER — ASPIRIN/CALCIUM CARB/MAGNESIUM 324 MG
325 TABLET ORAL DAILY
Refills: 0 | Status: DISCONTINUED | OUTPATIENT
Start: 2021-11-06 | End: 2021-11-09

## 2021-11-05 RX ORDER — ACETAMINOPHEN 500 MG
1000 TABLET ORAL ONCE
Refills: 0 | Status: COMPLETED | OUTPATIENT
Start: 2021-11-05 | End: 2021-11-05

## 2021-11-05 RX ORDER — OXYCODONE HYDROCHLORIDE 5 MG/1
5 TABLET ORAL EVERY 4 HOURS
Refills: 0 | Status: DISCONTINUED | OUTPATIENT
Start: 2021-11-06 | End: 2021-11-09

## 2021-11-05 RX ORDER — MEPERIDINE HYDROCHLORIDE 50 MG/ML
25 INJECTION INTRAMUSCULAR; INTRAVENOUS; SUBCUTANEOUS ONCE
Refills: 0 | Status: DISCONTINUED | OUTPATIENT
Start: 2021-11-05 | End: 2021-11-06

## 2021-11-05 RX ORDER — FAMOTIDINE 10 MG/ML
20 INJECTION INTRAVENOUS EVERY 12 HOURS
Refills: 0 | Status: DISCONTINUED | OUTPATIENT
Start: 2021-11-05 | End: 2021-11-06

## 2021-11-05 RX ORDER — IPRATROPIUM BROMIDE 0.2 MG/ML
2 SOLUTION, NON-ORAL INHALATION EVERY 6 HOURS
Refills: 0 | Status: DISCONTINUED | OUTPATIENT
Start: 2021-11-05 | End: 2021-11-06

## 2021-11-05 RX ORDER — PROPOFOL 10 MG/ML
15 INJECTION, EMULSION INTRAVENOUS
Qty: 1000 | Refills: 0 | Status: DISCONTINUED | OUTPATIENT
Start: 2021-11-05 | End: 2021-11-06

## 2021-11-05 RX ORDER — SODIUM CHLORIDE 9 MG/ML
1000 INJECTION INTRAMUSCULAR; INTRAVENOUS; SUBCUTANEOUS
Refills: 0 | Status: DISCONTINUED | OUTPATIENT
Start: 2021-11-05 | End: 2021-11-09

## 2021-11-05 RX ORDER — CEFAZOLIN SODIUM 1 G
1000 VIAL (EA) INJECTION EVERY 8 HOURS
Refills: 0 | Status: COMPLETED | OUTPATIENT
Start: 2021-11-05 | End: 2021-11-06

## 2021-11-05 RX ORDER — DEXMEDETOMIDINE HYDROCHLORIDE IN 0.9% SODIUM CHLORIDE 4 UG/ML
0.25 INJECTION INTRAVENOUS
Qty: 200 | Refills: 0 | Status: DISCONTINUED | OUTPATIENT
Start: 2021-11-05 | End: 2021-11-06

## 2021-11-05 RX ORDER — NICARDIPINE HYDROCHLORIDE 30 MG/1
5 CAPSULE, EXTENDED RELEASE ORAL
Qty: 40 | Refills: 0 | Status: DISCONTINUED | OUTPATIENT
Start: 2021-11-05 | End: 2021-11-06

## 2021-11-05 RX ORDER — FENTANYL CITRATE 50 UG/ML
25 INJECTION INTRAVENOUS ONCE
Refills: 0 | Status: DISCONTINUED | OUTPATIENT
Start: 2021-11-05 | End: 2021-11-06

## 2021-11-05 RX ORDER — DEXTROSE 50 % IN WATER 50 %
50 SYRINGE (ML) INTRAVENOUS
Refills: 0 | Status: DISCONTINUED | OUTPATIENT
Start: 2021-11-05 | End: 2021-11-09

## 2021-11-05 RX ORDER — POLYETHYLENE GLYCOL 3350 17 G/17G
17 POWDER, FOR SOLUTION ORAL DAILY
Refills: 0 | Status: DISCONTINUED | OUTPATIENT
Start: 2021-11-05 | End: 2021-11-09

## 2021-11-05 RX ORDER — ALBUMIN HUMAN 25 %
500 VIAL (ML) INTRAVENOUS ONCE
Refills: 0 | Status: COMPLETED | OUTPATIENT
Start: 2021-11-05 | End: 2021-11-05

## 2021-11-05 RX ORDER — CHLORHEXIDINE GLUCONATE 213 G/1000ML
15 SOLUTION TOPICAL EVERY 12 HOURS
Refills: 0 | Status: DISCONTINUED | OUTPATIENT
Start: 2021-11-05 | End: 2021-11-06

## 2021-11-05 RX ORDER — NOREPINEPHRINE BITARTRATE/D5W 8 MG/250ML
0.05 PLASTIC BAG, INJECTION (ML) INTRAVENOUS
Qty: 8 | Refills: 0 | Status: DISCONTINUED | OUTPATIENT
Start: 2021-11-05 | End: 2021-11-06

## 2021-11-05 RX ORDER — INSULIN HUMAN 100 [IU]/ML
1 INJECTION, SOLUTION SUBCUTANEOUS
Qty: 100 | Refills: 0 | Status: DISCONTINUED | OUTPATIENT
Start: 2021-11-05 | End: 2021-11-06

## 2021-11-05 RX ORDER — ALBUTEROL 90 UG/1
2 AEROSOL, METERED ORAL EVERY 6 HOURS
Refills: 0 | Status: DISCONTINUED | OUTPATIENT
Start: 2021-11-05 | End: 2021-11-06

## 2021-11-05 RX ORDER — ACETAMINOPHEN 500 MG
650 TABLET ORAL EVERY 6 HOURS
Refills: 0 | Status: DISCONTINUED | OUTPATIENT
Start: 2021-11-06 | End: 2021-11-09

## 2021-11-05 RX ORDER — NITROGLYCERIN 6.5 MG
5 CAPSULE, EXTENDED RELEASE ORAL
Qty: 50 | Refills: 0 | Status: DISCONTINUED | OUTPATIENT
Start: 2021-11-05 | End: 2021-11-06

## 2021-11-05 RX ORDER — ASPIRIN/CALCIUM CARB/MAGNESIUM 324 MG
300 TABLET ORAL ONCE
Refills: 0 | Status: COMPLETED | OUTPATIENT
Start: 2021-11-05 | End: 2021-11-05

## 2021-11-05 RX ORDER — CHLORHEXIDINE GLUCONATE 213 G/1000ML
5 SOLUTION TOPICAL
Refills: 0 | Status: DISCONTINUED | OUTPATIENT
Start: 2021-11-05 | End: 2021-11-06

## 2021-11-05 RX ORDER — VASOPRESSIN 20 [USP'U]/ML
0.04 INJECTION INTRAVENOUS
Qty: 50 | Refills: 0 | Status: DISCONTINUED | OUTPATIENT
Start: 2021-11-05 | End: 2021-11-06

## 2021-11-05 RX ORDER — ALBUMIN HUMAN 25 %
1000 VIAL (ML) INTRAVENOUS ONCE
Refills: 0 | Status: COMPLETED | OUTPATIENT
Start: 2021-11-05 | End: 2021-11-05

## 2021-11-05 RX ORDER — OXYCODONE HYDROCHLORIDE 5 MG/1
10 TABLET ORAL EVERY 4 HOURS
Refills: 0 | Status: DISCONTINUED | OUTPATIENT
Start: 2021-11-06 | End: 2021-11-09

## 2021-11-05 RX ORDER — DEXTROSE 50 % IN WATER 50 %
25 SYRINGE (ML) INTRAVENOUS
Refills: 0 | Status: DISCONTINUED | OUTPATIENT
Start: 2021-11-05 | End: 2021-11-09

## 2021-11-05 RX ORDER — MAGNESIUM SULFATE 500 MG/ML
1 VIAL (ML) INJECTION EVERY 12 HOURS
Refills: 0 | Status: DISCONTINUED | OUTPATIENT
Start: 2021-11-05 | End: 2021-11-09

## 2021-11-05 RX ADMIN — Medication 300 MILLIGRAM(S): at 21:10

## 2021-11-05 RX ADMIN — CHLORHEXIDINE GLUCONATE 5 MILLILITER(S): 213 SOLUTION TOPICAL at 19:29

## 2021-11-05 RX ADMIN — Medication 500 MILLILITER(S): at 17:29

## 2021-11-05 RX ADMIN — DEXMEDETOMIDINE HYDROCHLORIDE IN 0.9% SODIUM CHLORIDE 5.52 MICROGRAM(S)/KG/HR: 4 INJECTION INTRAVENOUS at 15:36

## 2021-11-05 RX ADMIN — Medication 8.28 MICROGRAM(S)/KG/MIN: at 15:36

## 2021-11-05 RX ADMIN — Medication 400 MILLIGRAM(S): at 21:00

## 2021-11-05 RX ADMIN — CHLORHEXIDINE GLUCONATE 15 MILLILITER(S): 213 SOLUTION TOPICAL at 06:50

## 2021-11-05 RX ADMIN — SODIUM CHLORIDE 20 MILLILITER(S): 9 INJECTION INTRAMUSCULAR; INTRAVENOUS; SUBCUTANEOUS at 15:35

## 2021-11-05 RX ADMIN — Medication 250 MILLILITER(S): at 21:50

## 2021-11-05 RX ADMIN — Medication 1000 MILLIGRAM(S): at 21:15

## 2021-11-05 RX ADMIN — FAMOTIDINE 20 MILLIGRAM(S): 10 INJECTION INTRAVENOUS at 17:28

## 2021-11-05 RX ADMIN — Medication 100 GRAM(S): at 17:28

## 2021-11-05 RX ADMIN — Medication 100 MILLIGRAM(S): at 17:28

## 2021-11-05 NOTE — DISCHARGE NOTE PROVIDER - HOSPITAL COURSE
Patient is a 60y/o Male PMH: DM, HLD, + FH MI (Father  at 65 yoa from [place]). Pt reports intermittent CP and SOB with activity over past few months, had positive stress echo reveals diffuse hypokinesis of apical and septal walls. Patient presented for elective LHC which revealed 3vCAD w/LM disease. He was admitted post catheterization and underwent myocardial revascularization the following day, via CABG. His PO course was Patient is a 60y/o Male PMH: DM, HLD, + FH MI (Father  at 65 yoa from [place]). Pt reports intermittent CP and SOB with activity over past few months, had positive stress echo reveals diffuse hypokinesis of apical and septal walls. Patient presented for elective LHC which revealed 3vCAD w/LM disease. He was admitted post catheterization and underwent myocardial revascularization the following day, via CABG. Post-operatively, patient had a right pleural effusion s/p thoracentesis which drained 900cc of serosanginous fluid. Patient otherwise had an uneventful hospital course. He was discharged home in stable condition on POD#4 with instructions to follow up with DR. Goddard on  @ 9:30am.

## 2021-11-05 NOTE — DISCHARGE NOTE PROVIDER - PROVIDER TOKENS
PROVIDER:[TOKEN:[[id number]],FOLLOWUP:[1 month]],PROVIDER:[TOKEN:[[id number]],FOLLOWUP:[1 month]],PROVIDER:[TOKEN:[[id number]],FOLLOWUP:[1 week]] PROVIDER:[TOKEN:[[id number]],FOLLOWUP:[1 month]],PROVIDER:[TOKEN:[[id number]],FOLLOWUP:[1 month]],PROVIDER:[TOKEN:[[id number]],SCHEDULEDAPPT:[11/17/2021],SCHEDULEDAPPTTIME:[09:30 AM]]

## 2021-11-05 NOTE — DISCHARGE NOTE PROVIDER - CARE PROVIDERS DIRECT ADDRESSES
,DirectAddress_Unknown,[email or web address]

## 2021-11-05 NOTE — DISCHARGE NOTE PROVIDER - CARE PROVIDER_API CALL
Mike Rene)  Cardiology; Internal Medicine; Interventional Cardiology  [address] [place] [address]  Phone: [phone]  Fax: [phone]  Follow Up Time: 1 month    Juan Sosa  [address] [address]  Phone: [phone]  Fax: [phone]  Follow Up Time: 1 month    Jacob Goddard)  Surgery; Thoracic and Cardiac Surgery  [address] [place] [address], [facility] [address]  [place] [address]  Phone: [phone]  Fax: [phone]  Follow Up Time: 1 week   Mike Rene)  Cardiology; Internal Medicine; Interventional Cardiology  [address] [place] [address]  Phone: [phone]  Fax: [phone]  Follow Up Time: 1 month    [place] [address]  Phone: [phone]  Fax: [phone]  Follow Up Time: 1 month    Jacob Goddard)  Surgery; Thoracic and Cardiac Surgery  [address] [place] [address], [address]  [place] [address]  Phone: [phone]  Fax: [phone]  Scheduled Appointment: 11/17/2021 09:30 AM

## 2021-11-05 NOTE — BRIEF OPERATIVE NOTE - NSICDXBRIEFPREOP_GEN_ALL_CORE_FT
PRE-OP DIAGNOSIS:  Left main coronary artery disease 05-Nov-2021 15:34:15  Jacob Goddard  Coronary artery disease with exertional angina 05-Nov-2021 15:34:01  Jacob Goddard

## 2021-11-05 NOTE — DISCHARGE NOTE PROVIDER - NSDCMRMEDTOKEN_GEN_ALL_CORE_FT
Aspir 81 oral delayed release tablet: 1 tab(s) orally once a day  atorvastatin 40 mg oral tablet: 1 tab(s) orally once a day  metFORMIN 500 mg oral tablet: orally 3 times a day  Multiple Vitamins oral capsule: 1 cap(s) orally once a day   aspirin 325 mg oral delayed release tablet: 1 tab(s) orally once a day  atorvastatin 40 mg oral tablet: 1 tab(s) orally once a day  famotidine 20 mg oral tablet: 1 tab(s) orally 2 times a day  furosemide 40 mg oral tablet: 1 tab(s) orally once a day   metFORMIN 500 mg oral tablet: orally 3 times a day  metoprolol tartrate 25 mg oral tablet: 0.5 tab(s) orally 2 times a day   Multiple Vitamins oral capsule: 1 cap(s) orally once a day  oxycodone-acetaminophen 5 mg-325 mg oral tablet: 1 tab(s) orally every 6 hours, As Needed -for moderate pain MDD:4   polyethylene glycol 3350 oral powder for reconstitution: 17 gram(s) orally once a day  potassium chloride 20 mEq oral tablet, extended release: 1 tab(s) orally once a day

## 2021-11-05 NOTE — BRIEF OPERATIVE NOTE - COMMENTS
I, Dr. Villegas, was present as the first assistant for the major portion of the case including the entire duration while constructing the distal and proximal coronary anastomosis.

## 2021-11-05 NOTE — PRE-OP CHECKLIST - SELECT TESTS ORDERED
BMP/CBC/CMP/PT/PTT/INR/Hepatic Function/Spirometry/Type and Cross/Type and Screen/Urinalysis/EKG/CXR

## 2021-11-05 NOTE — BRIEF OPERATIVE NOTE - NSICDXBRIEFPOSTOP_GEN_ALL_CORE_FT
POST-OP DIAGNOSIS:  Left main coronary artery disease 05-Nov-2021 15:34:35  Jacob Goddard  Coronary artery disease with exertional angina 05-Nov-2021 15:34:42  Jacob Goddard

## 2021-11-06 LAB
ALBUMIN SERPL ELPH-MCNC: 4.8 G/DL — SIGNIFICANT CHANGE UP (ref 3.5–5.2)
ALP SERPL-CCNC: 35 U/L — SIGNIFICANT CHANGE UP (ref 30–115)
ALT FLD-CCNC: 10 U/L — SIGNIFICANT CHANGE UP (ref 0–41)
ANION GAP SERPL CALC-SCNC: 17 MMOL/L — HIGH (ref 7–14)
ANION GAP SERPL CALC-SCNC: 19 MMOL/L — HIGH (ref 7–14)
AST SERPL-CCNC: 17 U/L — SIGNIFICANT CHANGE UP (ref 0–41)
BASE EXCESS BLDMV CALC-SCNC: -7.1 MMOL/L — SIGNIFICANT CHANGE UP
BASOPHILS # BLD AUTO: 0.02 K/UL — SIGNIFICANT CHANGE UP (ref 0–0.2)
BASOPHILS NFR BLD AUTO: 0.2 % — SIGNIFICANT CHANGE UP (ref 0–1)
BILIRUB SERPL-MCNC: 1 MG/DL — SIGNIFICANT CHANGE UP (ref 0.2–1.2)
BUN SERPL-MCNC: 14 MG/DL — SIGNIFICANT CHANGE UP (ref 10–20)
BUN SERPL-MCNC: 19 MG/DL — SIGNIFICANT CHANGE UP (ref 10–20)
CALCIUM SERPL-MCNC: 7.7 MG/DL — LOW (ref 8.5–10.1)
CALCIUM SERPL-MCNC: 8.3 MG/DL — LOW (ref 8.5–10.1)
CHLORIDE SERPL-SCNC: 105 MMOL/L — SIGNIFICANT CHANGE UP (ref 98–110)
CHLORIDE SERPL-SCNC: 106 MMOL/L — SIGNIFICANT CHANGE UP (ref 98–110)
CO2 SERPL-SCNC: 17 MMOL/L — SIGNIFICANT CHANGE UP (ref 17–32)
CO2 SERPL-SCNC: 18 MMOL/L — SIGNIFICANT CHANGE UP (ref 17–32)
CREAT SERPL-MCNC: 0.7 MG/DL — SIGNIFICANT CHANGE UP (ref 0.7–1.5)
CREAT SERPL-MCNC: 0.8 MG/DL — SIGNIFICANT CHANGE UP (ref 0.7–1.5)
EOSINOPHIL # BLD AUTO: 0 K/UL — SIGNIFICANT CHANGE UP (ref 0–0.7)
EOSINOPHIL NFR BLD AUTO: 0 % — SIGNIFICANT CHANGE UP (ref 0–8)
GAS PNL BLDA: SIGNIFICANT CHANGE UP
GAS PNL BLDMV: SIGNIFICANT CHANGE UP
GLUCOSE BLDC GLUCOMTR-MCNC: 118 MG/DL — HIGH (ref 70–99)
GLUCOSE BLDC GLUCOMTR-MCNC: 127 MG/DL — HIGH (ref 70–99)
GLUCOSE BLDC GLUCOMTR-MCNC: 129 MG/DL — HIGH (ref 70–99)
GLUCOSE BLDC GLUCOMTR-MCNC: 130 MG/DL — HIGH (ref 70–99)
GLUCOSE BLDC GLUCOMTR-MCNC: 165 MG/DL — HIGH (ref 70–99)
GLUCOSE BLDC GLUCOMTR-MCNC: 177 MG/DL — HIGH (ref 70–99)
GLUCOSE BLDC GLUCOMTR-MCNC: 207 MG/DL — HIGH (ref 70–99)
GLUCOSE BLDC GLUCOMTR-MCNC: 245 MG/DL — HIGH (ref 70–99)
GLUCOSE SERPL-MCNC: 114 MG/DL — HIGH (ref 70–99)
GLUCOSE SERPL-MCNC: 184 MG/DL — HIGH (ref 70–99)
HCO3 BLDMV-SCNC: 19 MMOL/L — SIGNIFICANT CHANGE UP
HCT VFR BLD CALC: 26.9 % — LOW (ref 42–52)
HCT VFR BLD CALC: 28.9 % — LOW (ref 42–52)
HGB BLD-MCNC: 8.9 G/DL — LOW (ref 14–18)
HGB BLD-MCNC: 9.8 G/DL — LOW (ref 14–18)
IMM GRANULOCYTES NFR BLD AUTO: 0.3 % — SIGNIFICANT CHANGE UP (ref 0.1–0.3)
LYMPHOCYTES # BLD AUTO: 1.02 K/UL — LOW (ref 1.2–3.4)
LYMPHOCYTES # BLD AUTO: 8.7 % — LOW (ref 20.5–51.1)
MAGNESIUM SERPL-MCNC: 2.5 MG/DL — HIGH (ref 1.8–2.4)
MAGNESIUM SERPL-MCNC: 2.8 MG/DL — HIGH (ref 1.8–2.4)
MCHC RBC-ENTMCNC: 29.8 PG — SIGNIFICANT CHANGE UP (ref 27–31)
MCHC RBC-ENTMCNC: 30.1 PG — SIGNIFICANT CHANGE UP (ref 27–31)
MCHC RBC-ENTMCNC: 33.1 G/DL — SIGNIFICANT CHANGE UP (ref 32–37)
MCHC RBC-ENTMCNC: 33.9 G/DL — SIGNIFICANT CHANGE UP (ref 32–37)
MCV RBC AUTO: 88.7 FL — SIGNIFICANT CHANGE UP (ref 80–94)
MCV RBC AUTO: 90 FL — SIGNIFICANT CHANGE UP (ref 80–94)
MONOCYTES # BLD AUTO: 0.89 K/UL — HIGH (ref 0.1–0.6)
MONOCYTES NFR BLD AUTO: 7.6 % — SIGNIFICANT CHANGE UP (ref 1.7–9.3)
NEUTROPHILS # BLD AUTO: 9.8 K/UL — HIGH (ref 1.4–6.5)
NEUTROPHILS NFR BLD AUTO: 83.2 % — HIGH (ref 42.2–75.2)
NRBC # BLD: 0 /100 WBCS — SIGNIFICANT CHANGE UP (ref 0–0)
NRBC # BLD: 0 /100 WBCS — SIGNIFICANT CHANGE UP (ref 0–0)
O2 CT VFR BLD CALC: 35 MMHG — SIGNIFICANT CHANGE UP
PCO2 BLDMV: 38 MMHG — SIGNIFICANT CHANGE UP
PH BLDMV: 7.3 — SIGNIFICANT CHANGE UP
PLATELET # BLD AUTO: 125 K/UL — LOW (ref 130–400)
PLATELET # BLD AUTO: 149 K/UL — SIGNIFICANT CHANGE UP (ref 130–400)
POTASSIUM SERPL-MCNC: 4.2 MMOL/L — SIGNIFICANT CHANGE UP (ref 3.5–5)
POTASSIUM SERPL-MCNC: 4.5 MMOL/L — SIGNIFICANT CHANGE UP (ref 3.5–5)
POTASSIUM SERPL-SCNC: 4.2 MMOL/L — SIGNIFICANT CHANGE UP (ref 3.5–5)
POTASSIUM SERPL-SCNC: 4.5 MMOL/L — SIGNIFICANT CHANGE UP (ref 3.5–5)
PROT SERPL-MCNC: 5.8 G/DL — LOW (ref 6–8)
RBC # BLD: 2.99 M/UL — LOW (ref 4.7–6.1)
RBC # BLD: 3.26 M/UL — LOW (ref 4.7–6.1)
RBC # FLD: 11.9 % — SIGNIFICANT CHANGE UP (ref 11.5–14.5)
RBC # FLD: 12.5 % — SIGNIFICANT CHANGE UP (ref 11.5–14.5)
SAO2 % BLDMV: 63.9 % — SIGNIFICANT CHANGE UP
SODIUM SERPL-SCNC: 140 MMOL/L — SIGNIFICANT CHANGE UP (ref 135–146)
SODIUM SERPL-SCNC: 142 MMOL/L — SIGNIFICANT CHANGE UP (ref 135–146)
WBC # BLD: 11.76 K/UL — HIGH (ref 4.8–10.8)
WBC # BLD: 14.29 K/UL — HIGH (ref 4.8–10.8)
WBC # FLD AUTO: 11.76 K/UL — HIGH (ref 4.8–10.8)
WBC # FLD AUTO: 14.29 K/UL — HIGH (ref 4.8–10.8)

## 2021-11-06 PROCEDURE — 71045 X-RAY EXAM CHEST 1 VIEW: CPT | Mod: 26,76

## 2021-11-06 PROCEDURE — 99233 SBSQ HOSP IP/OBS HIGH 50: CPT

## 2021-11-06 PROCEDURE — 93010 ELECTROCARDIOGRAM REPORT: CPT

## 2021-11-06 RX ORDER — INSULIN LISPRO 100/ML
4 VIAL (ML) SUBCUTANEOUS ONCE
Refills: 0 | Status: COMPLETED | OUTPATIENT
Start: 2021-11-06 | End: 2021-11-06

## 2021-11-06 RX ORDER — INSULIN LISPRO 100/ML
VIAL (ML) SUBCUTANEOUS
Refills: 0 | Status: DISCONTINUED | OUTPATIENT
Start: 2021-11-06 | End: 2021-11-09

## 2021-11-06 RX ORDER — ATORVASTATIN CALCIUM 80 MG/1
40 TABLET, FILM COATED ORAL AT BEDTIME
Refills: 0 | Status: DISCONTINUED | OUTPATIENT
Start: 2021-11-06 | End: 2021-11-09

## 2021-11-06 RX ORDER — METFORMIN HYDROCHLORIDE 850 MG/1
500 TABLET ORAL
Refills: 0 | Status: DISCONTINUED | OUTPATIENT
Start: 2021-11-06 | End: 2021-11-09

## 2021-11-06 RX ORDER — FAMOTIDINE 10 MG/ML
20 INJECTION INTRAVENOUS
Refills: 0 | Status: DISCONTINUED | OUTPATIENT
Start: 2021-11-06 | End: 2021-11-09

## 2021-11-06 RX ORDER — SODIUM BICARBONATE 1 MEQ/ML
50 SYRINGE (ML) INTRAVENOUS ONCE
Refills: 0 | Status: COMPLETED | OUTPATIENT
Start: 2021-11-06 | End: 2021-11-06

## 2021-11-06 RX ORDER — KETOROLAC TROMETHAMINE 30 MG/ML
30 SYRINGE (ML) INJECTION ONCE
Refills: 0 | Status: DISCONTINUED | OUTPATIENT
Start: 2021-11-06 | End: 2021-11-06

## 2021-11-06 RX ORDER — METOPROLOL TARTRATE 50 MG
12.5 TABLET ORAL EVERY 12 HOURS
Refills: 0 | Status: DISCONTINUED | OUTPATIENT
Start: 2021-11-06 | End: 2021-11-09

## 2021-11-06 RX ORDER — COLCHICINE 0.6 MG
0.6 TABLET ORAL EVERY 12 HOURS
Refills: 0 | Status: DISCONTINUED | OUTPATIENT
Start: 2021-11-06 | End: 2021-11-09

## 2021-11-06 RX ORDER — SODIUM CHLORIDE 9 MG/ML
1000 INJECTION, SOLUTION INTRAVENOUS
Refills: 0 | Status: DISCONTINUED | OUTPATIENT
Start: 2021-11-06 | End: 2021-11-09

## 2021-11-06 RX ORDER — DEXTROSE 50 % IN WATER 50 %
15 SYRINGE (ML) INTRAVENOUS ONCE
Refills: 0 | Status: DISCONTINUED | OUTPATIENT
Start: 2021-11-06 | End: 2021-11-09

## 2021-11-06 RX ORDER — ENOXAPARIN SODIUM 100 MG/ML
40 INJECTION SUBCUTANEOUS DAILY
Refills: 0 | Status: DISCONTINUED | OUTPATIENT
Start: 2021-11-07 | End: 2021-11-09

## 2021-11-06 RX ORDER — GLUCAGON INJECTION, SOLUTION 0.5 MG/.1ML
1 INJECTION, SOLUTION SUBCUTANEOUS ONCE
Refills: 0 | Status: DISCONTINUED | OUTPATIENT
Start: 2021-11-06 | End: 2021-11-09

## 2021-11-06 RX ORDER — IPRATROPIUM/ALBUTEROL SULFATE 18-103MCG
3 AEROSOL WITH ADAPTER (GRAM) INHALATION EVERY 6 HOURS
Refills: 0 | Status: DISCONTINUED | OUTPATIENT
Start: 2021-11-06 | End: 2021-11-09

## 2021-11-06 RX ADMIN — Medication 600 MILLIGRAM(S): at 05:11

## 2021-11-06 RX ADMIN — Medication 0.6 MILLIGRAM(S): at 12:41

## 2021-11-06 RX ADMIN — OXYCODONE HYDROCHLORIDE 10 MILLIGRAM(S): 5 TABLET ORAL at 13:45

## 2021-11-06 RX ADMIN — CHLORHEXIDINE GLUCONATE 1 APPLICATION(S): 213 SOLUTION TOPICAL at 06:28

## 2021-11-06 RX ADMIN — Medication 100 MILLIGRAM(S): at 12:41

## 2021-11-06 RX ADMIN — FENTANYL CITRATE 25 MICROGRAM(S): 50 INJECTION INTRAVENOUS at 02:40

## 2021-11-06 RX ADMIN — Medication 325 MILLIGRAM(S): at 12:42

## 2021-11-06 RX ADMIN — Medication 50 MILLIEQUIVALENT(S): at 05:12

## 2021-11-06 RX ADMIN — Medication 5 MILLIGRAM(S): at 18:02

## 2021-11-06 RX ADMIN — Medication 650 MILLIGRAM(S): at 13:15

## 2021-11-06 RX ADMIN — Medication 5 MILLIGRAM(S): at 05:11

## 2021-11-06 RX ADMIN — Medication 30 MILLIGRAM(S): at 10:30

## 2021-11-06 RX ADMIN — Medication 600 MILLIGRAM(S): at 18:02

## 2021-11-06 RX ADMIN — FAMOTIDINE 20 MILLIGRAM(S): 10 INJECTION INTRAVENOUS at 05:11

## 2021-11-06 RX ADMIN — OXYCODONE HYDROCHLORIDE 10 MILLIGRAM(S): 5 TABLET ORAL at 21:30

## 2021-11-06 RX ADMIN — FENTANYL CITRATE 25 MICROGRAM(S): 50 INJECTION INTRAVENOUS at 02:25

## 2021-11-06 RX ADMIN — FAMOTIDINE 20 MILLIGRAM(S): 10 INJECTION INTRAVENOUS at 18:02

## 2021-11-06 RX ADMIN — POLYETHYLENE GLYCOL 3350 17 GRAM(S): 17 POWDER, FOR SOLUTION ORAL at 12:44

## 2021-11-06 RX ADMIN — Medication 50 MILLIEQUIVALENT(S): at 05:11

## 2021-11-06 RX ADMIN — OXYCODONE HYDROCHLORIDE 5 MILLIGRAM(S): 5 TABLET ORAL at 07:55

## 2021-11-06 RX ADMIN — Medication 0.6 MILLIGRAM(S): at 18:02

## 2021-11-06 RX ADMIN — Medication 4 UNIT(S): at 22:15

## 2021-11-06 RX ADMIN — ATORVASTATIN CALCIUM 40 MILLIGRAM(S): 80 TABLET, FILM COATED ORAL at 21:36

## 2021-11-06 RX ADMIN — OXYCODONE HYDROCHLORIDE 10 MILLIGRAM(S): 5 TABLET ORAL at 22:00

## 2021-11-06 RX ADMIN — SENNA PLUS 2 TABLET(S): 8.6 TABLET ORAL at 21:35

## 2021-11-06 RX ADMIN — Medication 100 GRAM(S): at 05:11

## 2021-11-06 RX ADMIN — Medication 30 MILLIGRAM(S): at 10:55

## 2021-11-06 RX ADMIN — Medication 12.5 MILLIGRAM(S): at 21:36

## 2021-11-06 RX ADMIN — OXYCODONE HYDROCHLORIDE 5 MILLIGRAM(S): 5 TABLET ORAL at 07:28

## 2021-11-06 RX ADMIN — CHLORHEXIDINE GLUCONATE 5 MILLILITER(S): 213 SOLUTION TOPICAL at 05:11

## 2021-11-06 RX ADMIN — Medication 50 MILLIEQUIVALENT(S): at 06:28

## 2021-11-06 RX ADMIN — CHLORHEXIDINE GLUCONATE 15 MILLILITER(S): 213 SOLUTION TOPICAL at 05:11

## 2021-11-06 RX ADMIN — Medication 650 MILLIGRAM(S): at 12:43

## 2021-11-06 RX ADMIN — OXYCODONE HYDROCHLORIDE 10 MILLIGRAM(S): 5 TABLET ORAL at 13:17

## 2021-11-06 RX ADMIN — Medication 12.5 MILLIGRAM(S): at 13:23

## 2021-11-06 RX ADMIN — Medication 4: at 16:01

## 2021-11-06 RX ADMIN — Medication 100 MILLIGRAM(S): at 01:04

## 2021-11-06 RX ADMIN — Medication 100 GRAM(S): at 18:02

## 2021-11-06 NOTE — PROGRESS NOTE ADULT - SUBJECTIVE AND OBJECTIVE BOX
OPERATIVE PROCEDURE(s):                POD #      1 CABG with radial and LIMA                 61yMale  SURGEON(s): YAMILET Goddard  SUBJECTIVE ASSESSMENT: incisional pain    Vital Signs Last 24 Hrs  T(F): 100 (2021 04:00), Max: 101.3 (2021 21:00)  HR: 78 (2021 07:00) (75 - 90)  BP: 102/61 (2021 04:00) (91/55 - 126/85)  BP(mean): 76 (2021 04:00) (68 - 101)  ABP: 112/47 (2021 07:00) (78/45 - 133/64)  ABP(mean): 63 (2021 07:00)  RR: 19 (2021 07:00) (0 - 26)  SpO2: 98% (2021 07:00) (90% - 100%)  CVP(mm Hg): 7 (2021 07:00)  CVP(cm H2O): --  CO: 4.6 (2021 06:30)  CI: 2.3 (2021 06:30)  PA: 26/11 (2021 07:00)  SVR: 1007 (2021 06:30)  Mode: CPAP with PS  FiO2: 40  PEEP: 5  PS: 5    I&O's Detail    2021 07:01  -  2021 07:00  --------------------------------------------------------  IN:    Albumin 5%  - 500 mL: 1500 mL    Dexmedetomidine: 122.5 mL    Insulin: 25 mL    IV PiggyBack: 550 mL    NiCARdipine: 25.3 mL    Norepinephrine: 74 mL    Propofol: 35 mL    sodium chloride 0.9%: 340 mL    Vasopressin: 26.4 mL  Total IN: 2698.2 mL    OUT:    Chest Tube (mL): 185 mL    Chest Tube (mL): 33 mL    Chest Tube (mL): 152 mL    Indwelling Catheter - Urethral (mL): 1700 mL    Intermittent Catheterization - Urethral (mL): 250 mL    Nitroglycerin: 0 mL  Total OUT: 2320 mL        Net:   I&O's Detail    2021 07:01  -  2021 07:00  --------------------------------------------------------  Total NET: 74 mL      2021 07:01  -  2021 07:00  --------------------------------------------------------  Total NET: 378.2 mL        CAPILLARY BLOOD GLUCOSE  129 (2021 06:30)  118 (2021 04:00)  130 (2021 01:00)  127 (2021 00:00)  124 (2021 23:00)  121 (2021 22:00)  100 (2021 21:00)  122 (2021 20:00)      POCT Blood Glucose.: 129 mg/dL (2021 06:30)  POCT Blood Glucose.: 118 mg/dL (2021 03:40)  POCT Blood Glucose.: 130 mg/dL (2021 01:11)  POCT Blood Glucose.: 127 mg/dL (2021 00:08)  POCT Blood Glucose.: 124 mg/dL (2021 23:04)  POCT Blood Glucose.: 121 mg/dL (2021 22:16)  POCT Blood Glucose.: 100 mg/dL (2021 21:19)  POCT Blood Glucose.: 122 mg/dL (2021 20:04)  POCT Blood Glucose.: 145 mg/dL (2021 18:38)  POCT Blood Glucose.: 149 mg/dL (2021 17:50)  POCT Blood Glucose.: 146 mg/dL (2021 16:09)    Physical Exam:  General: NAD; A&Ox3  Cardiac: S1/S2, RRR, no murmur, no rubs  Lungs: unlabored respirations, CTA b/l, no wheeze, no rales, no crackles  Abdomen: Soft/NT/protuberant  Sternum: Intact, no click, incision healing well with no drainage  Incisions: Incisions clean/dry/intact  Extremities: No edema b/l lower extremities    Central Venous Catheter: Yes[]  critical patient   Zamora Catheter: Yes  [] , critical patient strict I&O  NGT: Yes []   EPICARDIAL WIRES:  [] YES  BOWEL MOVEMENT:  no  CHEST TUBE(Left/Right):  [] YES       LABS:                        8.9<L>  11.76<H> )-----------( 125<L>    ( 2021 01:46 )             26.9<L>                        10.1<L>  15.36<H> )-----------( 133      ( 2021 15:56 )             29.8<L>        140  |  106  |  14  ----------------------------<  114<H>  4.5   |  17  |  0.7      141  |  107  |  13  ----------------------------<  130<H>  4.5   |  16<L>  |  0.6<L>    Ca    7.7<L>      2021 01:46  Mg     2.5         TPro  5.8<L> [6.0 - 8.0]  /  Alb  4.8 [3.5 - 5.2]  /  TBili  1.0 [0.2 - 1.2]  /  DBili  x   /  AST  17 [0 - 41]  /  ALT  10 [0 - 41]  /  AlkPhos  35 [30 - 115]  11-06    PT/INR - ( 2021 15:56 )   PT: ;   INR: 1.30 ratio         PT/INR - ( 2021 23:49 )   PT: ;   INR: 1.03 ratio         PTT - ( 2021 15:56 )  PTT:24.1 sec, PTT - ( 2021 23:49 )  PTT:32.7 sec  Urinalysis Basic - ( 2021 10:55 )    Color: Light Yellow / Appearance: Clear / S.048 / pH: x  Gluc: x / Ketone: Negative  / Bili: Negative / Urobili: <2 mg/dL   Blood: x / Protein: Negative / Nitrite: Negative   Leuk Esterase: Negative / RBC: x / WBC x   Sq Epi: x / Non Sq Epi: x / Bacteria: x      ABG - ( 2021 06:02 )  pH: 7.36  /  pCO2: 35    /  pO2: 75    / HCO3: 20    / Base Excess: -5.1  /  SaO2: 97.1  /  LA: 1.10       RADIOLOGY & ADDITIONAL TESTS:  CXR:  EKG:  MEDICATIONS  (STANDING):  ALBUTerol    90 MICROgram(s) HFA Inhaler 2 Puff(s) Inhalation every 6 hours  aspirin enteric coated 325 milliGRAM(s) Oral daily  bisacodyl 5 milliGRAM(s) Oral every 12 hours  ceFAZolin   IVPB 1000 milliGRAM(s) IV Intermittent every 8 hours  chlorhexidine 0.12% Liquid 5 milliLiter(s) Oral Mucosa two times a day  chlorhexidine 0.12% Liquid 15 milliLiter(s) Oral Mucosa every 12 hours  chlorhexidine 4% Liquid 1 Application(s) Topical <User Schedule>  dexMEDEtomidine Infusion 0.25 MICROgram(s)/kG/Hr (5.52 mL/Hr) IV Continuous <Continuous>  dextrose 50% Injectable 50 milliLiter(s) IV Push every 15 minutes  dextrose 50% Injectable 25 milliLiter(s) IV Push every 15 minutes  famotidine Injectable 20 milliGRAM(s) IV Push every 12 hours  guaiFENesin  milliGRAM(s) Oral every 12 hours  insulin regular Infusion 1 Unit(s)/Hr (1 mL/Hr) IV Continuous <Continuous>  ipratropium 17 MICROgram(s) HFA Inhaler 2 Puff(s) Inhalation every 6 hours  magnesium sulfate  IVPB 1 Gram(s) IV Intermittent every 12 hours  meperidine     Injectable 25 milliGRAM(s) IV Push once  niCARdipine Infusion 5 mG/Hr (25 mL/Hr) IV Continuous <Continuous>  nitroglycerin  Infusion 5 MICROgram(s)/Min (1.5 mL/Hr) IV Continuous <Continuous>  norepinephrine Infusion 0.05 MICROgram(s)/kG/Min (8.28 mL/Hr) IV Continuous <Continuous>  polyethylene glycol 3350 17 Gram(s) Oral daily  propofol Infusion 15 MICROgram(s)/kG/Min (7.95 mL/Hr) IV Continuous <Continuous>  senna 2 Tablet(s) Oral at bedtime  sodium chloride 0.9%. 1000 milliLiter(s) (20 mL/Hr) IV Continuous <Continuous>  vasopressin Infusion 0.04 Unit(s)/Min (2.4 mL/Hr) IV Continuous <Continuous>    MEDICATIONS  (PRN):  acetaminophen     Tablet .. 650 milliGRAM(s) Oral every 6 hours PRN Temp greater or equal to 38C (100.4F), Mild Pain (1 - 3)  ondansetron  IVPB 4 milliGRAM(s) IV Intermittent once PRN Nausea and/or Vomiting  oxyCODONE    IR 10 milliGRAM(s) Oral every 4 hours PRN Severe Pain (7 - 10)  oxyCODONE    IR 5 milliGRAM(s) Oral every 4 hours PRN Moderate Pain (4 - 6)    Allergies    No Known Allergies    Intolerances      Ambulation/Activity Status: ambulate as tolerated    Assessment/Plan:  61y Male status-post CABG with radial and Lima  - Case and plan discussed with CTU Intensivist and CT Surgeon - Dr. Gannon/Nitza/Bakari  - Continue CTU supportive care    - Continue DVT  - Continue GI prophylaxis  - Incentive Spirometry 10 times an hour  - Continue to advance physical activity as tolerated and continue PT/OT as directed  - CAD: Continue ASA, start statin, BB  - Anemia secondary to:  Acute PO blood loss anemia,     stable         track and trend H/H  - fluid overload secondary too: ____   acute non cardiac fluid over load     - A. Fib:  prophylaxis with magnesium  - COPD/Hypoxia: mild Po respiratory insufficiency - continue supplemental O2  - DM/Glucose Control: continue insulin drip    Social Service Disposition:     OPERATIVE PROCEDURE(s):                POD #      1 CABG with radial and LIMA                 61yMale  SURGEON(s): YAMILET Goddard  SUBJECTIVE ASSESSMENT: incisional pain    Vital Signs Last 24 Hrs  T(F): 100 (2021 04:00), Max: 101.3 (2021 21:00)  HR: 78 (2021 07:00) (75 - 90)  BP: 102/61 (2021 04:00) (91/55 - 126/85)  BP(mean): 76 (2021 04:00) (68 - 101)  ABP: 112/47 (2021 07:00) (78/45 - 133/64)  ABP(mean): 63 (2021 07:00)  RR: 19 (2021 07:00) (0 - 26)  SpO2: 98% (2021 07:00) (90% - 100%)  CVP(mm Hg): 7 (2021 07:00)  CVP(cm H2O): --  CO: 4.6 (2021 06:30)  CI: 2.3 (2021 06:30)  PA: 26/11 (2021 07:00)  SVR: 1007 (2021 06:30)  Mode: CPAP with PS  FiO2: 40  PEEP: 5  PS: 5    I&O's Detail    2021 07:01  -  2021 07:00  --------------------------------------------------------  IN:    Albumin 5%  - 500 mL: 1500 mL    Dexmedetomidine: 122.5 mL    Insulin: 25 mL    IV PiggyBack: 550 mL    NiCARdipine: 25.3 mL    Norepinephrine: 74 mL    Propofol: 35 mL    sodium chloride 0.9%: 340 mL    Vasopressin: 26.4 mL  Total IN: 2698.2 mL    OUT:    Chest Tube (mL): 185 mL    Chest Tube (mL): 33 mL    Chest Tube (mL): 152 mL    Indwelling Catheter - Urethral (mL): 1700 mL    Intermittent Catheterization - Urethral (mL): 250 mL    Nitroglycerin: 0 mL  Total OUT: 2320 mL        Net:   I&O's Detail    2021 07:01  -  2021 07:00  --------------------------------------------------------  Total NET: 74 mL      2021 07:01  -  2021 07:00  --------------------------------------------------------  Total NET: 378.2 mL        CAPILLARY BLOOD GLUCOSE  129 (2021 06:30)  118 (2021 04:00)  130 (2021 01:00)  127 (2021 00:00)  124 (2021 23:00)  121 (2021 22:00)  100 (2021 21:00)  122 (2021 20:00)      POCT Blood Glucose.: 129 mg/dL (2021 06:30)  POCT Blood Glucose.: 118 mg/dL (2021 03:40)  POCT Blood Glucose.: 130 mg/dL (2021 01:11)  POCT Blood Glucose.: 127 mg/dL (2021 00:08)  POCT Blood Glucose.: 124 mg/dL (2021 23:04)  POCT Blood Glucose.: 121 mg/dL (2021 22:16)  POCT Blood Glucose.: 100 mg/dL (2021 21:19)  POCT Blood Glucose.: 122 mg/dL (2021 20:04)  POCT Blood Glucose.: 145 mg/dL (2021 18:38)  POCT Blood Glucose.: 149 mg/dL (2021 17:50)  POCT Blood Glucose.: 146 mg/dL (2021 16:09)    Physical Exam:  General: NAD; A&Ox3  Cardiac: S1/S2, RRR, no murmur, no rubs  Lungs: unlabored shallow respirations, b/l bs no wheeze, no rales, no crackles decreased at bases  Abdomen: Soft/NT/protuberant  Sternum: Intact, no click, incision healing well with no drainage  Incisions: Incisions clean/dry/intact  Extremities: No edema b/l lower extremities    Central Venous Catheter: Yes[]  critical patient   Zamora Catheter: Yes  [] , critical patient strict I&O  EPICARDIAL WIRES:  [] YES  BOWEL MOVEMENT:  no  CHEST TUBE(Left/Right):  [] YES       LABS:                        8.9<L>  11.76<H> )-----------( 125<L>    ( 2021 01:46 )             26.9<L>                        10.1<L>  15.36<H> )-----------( 133      ( 2021 15:56 )             29.8<L>        140  |  106  |  14  ----------------------------<  114<H>  4.5   |  17  |  0.7      141  |  107  |  13  ----------------------------<  130<H>  4.5   |  16<L>  |  0.6<L>    Ca    7.7<L>      2021 01:46  Mg     2.5     -    TPro  5.8<L> [6.0 - 8.0]  /  Alb  4.8 [3.5 - 5.2]  /  TBili  1.0 [0.2 - 1.2]  /  DBili  x   /  AST  17 [0 - 41]  /  ALT  10 [0 - 41]  /  AlkPhos  35 [30 - 115]  11-06    PT/INR - ( 2021 15:56 )   PT: ;   INR: 1.30 ratio         PT/INR - ( 2021 23:49 )   PT: ;   INR: 1.03 ratio         PTT - ( 2021 15:56 )  PTT:24.1 sec, PTT - ( 2021 23:49 )  PTT:32.7 sec  Urinalysis Basic - ( 2021 10:55 )    Color: Light Yellow / Appearance: Clear / S.048 / pH: x  Gluc: x / Ketone: Negative  / Bili: Negative / Urobili: <2 mg/dL   Blood: x / Protein: Negative / Nitrite: Negative   Leuk Esterase: Negative / RBC: x / WBC x   Sq Epi: x / Non Sq Epi: x / Bacteria: x      ABG - ( 2021 06:02 )  pH: 7.36  /  pCO2: 35    /  pO2: 75    / HCO3: 20    / Base Excess: -5.1  /  SaO2: 97.1  /  LA: 1.10       RADIOLOGY & ADDITIONAL TESTS:  CXR: < from: Xray Chest 1 View- PORTABLE-Routine (21 @ 07:07) >  Interval removal of endotracheal and enteric tubes.    Diffuse bilateral opacities.    < end of copied text >  < from: 12 Lead ECG (21 @ 09:21) >  Ventricular Rate 78 BPM    Atrial Rate 78 BPM    P-R Interval 170 ms    QRS Duration 88 ms    Q-T Interval 390 ms    QTC Calculation(Bazett) 444 ms    P Axis 10 degrees    R Axis -19 degrees    T Axis -15 degrees    Diagnosis Line Normal sinus rhythm  Inferior infarct , age undetermined  Anterolateral injury pattern  ** ** ACUTE MI / STEMI ** **  Abnormal ECG      MEDICATIONS  (STANDING):  ALBUTerol    90 MICROgram(s) HFA Inhaler 2 Puff(s) Inhalation every 6 hours  aspirin enteric coated 325 milliGRAM(s) Oral daily  bisacodyl 5 milliGRAM(s) Oral every 12 hours  ceFAZolin   IVPB 1000 milliGRAM(s) IV Intermittent every 8 hours  chlorhexidine 0.12% Liquid 5 milliLiter(s) Oral Mucosa two times a day  chlorhexidine 0.12% Liquid 15 milliLiter(s) Oral Mucosa every 12 hours  chlorhexidine 4% Liquid 1 Application(s) Topical <User Schedule>  dexMEDEtomidine Infusion 0.25 MICROgram(s)/kG/Hr (5.52 mL/Hr) IV Continuous <Continuous>  dextrose 50% Injectable 50 milliLiter(s) IV Push every 15 minutes  dextrose 50% Injectable 25 milliLiter(s) IV Push every 15 minutes  famotidine Injectable 20 milliGRAM(s) IV Push every 12 hours  guaiFENesin  milliGRAM(s) Oral every 12 hours  insulin regular Infusion 1 Unit(s)/Hr (1 mL/Hr) IV Continuous <Continuous>  ipratropium 17 MICROgram(s) HFA Inhaler 2 Puff(s) Inhalation every 6 hours  magnesium sulfate  IVPB 1 Gram(s) IV Intermittent every 12 hours  meperidine     Injectable 25 milliGRAM(s) IV Push once  niCARdipine Infusion 5 mG/Hr (25 mL/Hr) IV Continuous <Continuous>  nitroglycerin  Infusion 5 MICROgram(s)/Min (1.5 mL/Hr) IV Continuous <Continuous>  norepinephrine Infusion 0.05 MICROgram(s)/kG/Min (8.28 mL/Hr) IV Continuous <Continuous>  polyethylene glycol 3350 17 Gram(s) Oral daily  propofol Infusion 15 MICROgram(s)/kG/Min (7.95 mL/Hr) IV Continuous <Continuous>  senna 2 Tablet(s) Oral at bedtime  sodium chloride 0.9%. 1000 milliLiter(s) (20 mL/Hr) IV Continuous <Continuous>  vasopressin Infusion 0.04 Unit(s)/Min (2.4 mL/Hr) IV Continuous <Continuous>    MEDICATIONS  (PRN):  acetaminophen     Tablet .. 650 milliGRAM(s) Oral every 6 hours PRN Temp greater or equal to 38C (100.4F), Mild Pain (1 - 3)  ondansetron  IVPB 4 milliGRAM(s) IV Intermittent once PRN Nausea and/or Vomiting  oxyCODONE    IR 10 milliGRAM(s) Oral every 4 hours PRN Severe Pain (7 - 10)  oxyCODONE    IR 5 milliGRAM(s) Oral every 4 hours PRN Moderate Pain (4 - 6)    Allergies    No Known Allergies    Intolerances      Ambulation/Activity Status: ambulate as tolerated    Assessment/Plan:  61y Male status-post CABG with radial and Lima  - Case and plan discussed with CTU Intensivist and CT Surgeon - Dr. Villegas  - Continue CTU supportive care    - Continue DVT SCD's start Lovenox in AM  - Continue GI prophylaxis  - Incentive Spirometry 10 times an hour  - Continue to advance physical activity as tolerated and continue PT/OT as directed  - CAD: continue  ASA, start statin, BB  - Anemia secondary to:  Acute PO blood loss anemia,     stable         track and trend H/H  - A. Fib:  prophylaxis with magnesium  - COPD/Hypoxia: mild Po respiratory insufficiency - continue supplemental O2 wean to room air and duonebs  - DM/Glucose Control: discontinue insulin drip, start RISS A1C 7.2 may need to add lantus, plan to start home metformin tomorrow  - respiratory acidosis received sodium bicarb  - Elevated ST segements on EKG, consistent with pericarditis will start colchicine.   - Start Amlodipine 2.5 mg for radial artery harvest if blood pressure can tolerate, on low side today with initiation of BB      Social Service Disposition:  home went stable

## 2021-11-06 NOTE — PROGRESS NOTE ADULT - SUBJECTIVE AND OBJECTIVE BOX
CTU Attending Progress Daily Note     06 Nov 2021 11:30  Admited 11-04-21, Hospital Day 2d  POD# - 1    HPI:  Patient is a 61y Male PMH: DM, HLD, + FH MI. Pt reports intermittent CP and SOB with activity over past few months , had positive stress echo reveals hypokinesis apical anterior wall, apical septal wall, mid anterior wall, mid inferoseptal wall, basal anterior wall and basal inferoseptal wall, [facility] recommended    Home Medications:  Aspir 81 oral delayed release tablet: 1 tab(s) orally once a day (04 Nov 2021 09:13)  atorvastatin 40 mg oral tablet: 1 tab(s) orally once a day (04 Nov 2021 09:13)  metFORMIN 500 mg oral tablet: orally 3 times a day (04 Nov 2021 09:13)  Multiple Vitamins oral capsule: 1 cap(s) orally once a day (04 Nov 2021 09:13)    FAMILY HISTORY:  FH: CAD (coronary artery disease) (Father)      PAST MEDICAL & SURGICAL HISTORY:  DM (diabetes mellitus)    HLD (hyperlipidemia)    History of tonsillectomy and adenoidectomy      Interval event for past 24 hr:  JOVANNY BENITO  61y had no event.   Current Complains:  JOVANNY BENITO has no new complains  Allergies    No Known Allergies    Intolerances      OBJECTIVE:  Vitals last 24 hrs  T(C): 37.5 (11-06-21 @ 09:00), Max: 38.5 (11-05-21 @ 21:00)  T(F): 99.5 (11-06-21 @ 09:00), Max: 101.3 (11-05-21 @ 21:00)  HR: 76 (11-06-21 @ 09:00) (75 - 90)  BP: 111/61 (11-06-21 @ 08:00) (91/55 - 126/85)  ABP: 136/49 (11-06-21 @ 09:00) (78/45 - 142/48)  ABP(mean): 67 (11-06-21 @ 09:00) (54 - 83)  RR: 20 (11-06-21 @ 09:00) (0 - 26)  SpO2: 98% (11-06-21 @ 09:00) (90% - 100%)  CVP(mm Hg): 4 (11-06-21 @ 09:00) (1 - 303)  CI: 2.7 (11-06-21 @ 07:30) (2.3 - 3.2)  PA: 24/7 (11-06-21 @ 09:00) (10/6 - 65/52)  PA(direct): --  PCWP: --  SVR: 872 (11-06-21 @ 07:30) (563 - 5042)  PVR: --    11-05-21 @ 07:01  -  11-06-21 @ 07:00  --------------------------------------------------------  IN:    Albumin 5%  - 500 mL: 1500 mL    Dexmedetomidine: 122.5 mL    Insulin: 25 mL    IV PiggyBack: 550 mL    NiCARdipine: 25.3 mL    Norepinephrine: 74 mL    Propofol: 35 mL    sodium chloride 0.9%: 340 mL    Vasopressin: 26.4 mL  Total IN: 2698.2 mL    OUT:    Chest Tube (mL): 185 mL    Chest Tube (mL): 33 mL    Chest Tube (mL): 152 mL    Indwelling Catheter - Urethral (mL): 1700 mL    Intermittent Catheterization - Urethral (mL): 250 mL    Nitroglycerin: 0 mL  Total OUT: 2320 mL    Total NET: 378.2 mL      CAPILLARY BLOOD GLUCOSE  129 (06 Nov 2021 06:30)  118 (06 Nov 2021 04:00)  130 (06 Nov 2021 01:00)  127 (06 Nov 2021 00:00)  124 (05 Nov 2021 23:00)  121 (05 Nov 2021 22:00)  100 (05 Nov 2021 21:00)  122 (05 Nov 2021 20:00)      POCT Blood Glucose.: 165 mg/dL (06 Nov 2021 08:03)  POCT Blood Glucose.: 129 mg/dL (06 Nov 2021 06:30)  POCT Blood Glucose.: 118 mg/dL (06 Nov 2021 03:40)  POCT Blood Glucose.: 130 mg/dL (06 Nov 2021 01:11)  POCT Blood Glucose.: 127 mg/dL (06 Nov 2021 00:08)  POCT Blood Glucose.: 124 mg/dL (05 Nov 2021 23:04)  POCT Blood Glucose.: 121 mg/dL (05 Nov 2021 22:16)  POCT Blood Glucose.: 100 mg/dL (05 Nov 2021 21:19)  POCT Blood Glucose.: 122 mg/dL (05 Nov 2021 20:04)  POCT Blood Glucose.: 145 mg/dL (05 Nov 2021 18:38)  POCT Blood Glucose.: 149 mg/dL (05 Nov 2021 17:50)  POCT Blood Glucose.: 146 mg/dL (05 Nov 2021 16:09)    LABS:  ABG - ( 06 Nov 2021 09:45 )  pH, Arterial: 7.40  pH, Blood: x     /  pCO2: 33    /  pO2: 96    / HCO3: 20    / Base Excess: -3.8  /  SaO2: 98.4            Blood Gas Arterial, Lactate: 1.40 mmol/L (11-06-21 @ 09:45)  Blood Gas Arterial, Lactate: 1.10 mmol/L (11-06-21 @ 06:02)  Blood Gas Arterial, Lactate: 0.80 mmol/L (11-06-21 @ 02:22)  Blood Gas Arterial, Lactate: 0.70 mmol/L (11-05-21 @ 21:23)  Blood Gas Arterial, Lactate: 0.80 mmol/L (11-05-21 @ 20:27)  Blood Gas Arterial, Lactate: 1.20 mmol/L (11-05-21 @ 15:28)                          8.9    11.76 )-----------( 125      ( 06 Nov 2021 01:46 )             26.9     Hemoglobin: 8.9 g/dL (11-06 @ 01:46)  Hemoglobin: 10.1 g/dL (11-05 @ 15:56)  Hemoglobin: 16.1 g/dL (11-04 @ 22:40)  Hemoglobin: 16.5 g/dL (11-04 @ 08:48)    11-06    140  |  106  |  14  ----------------------------<  114<H>  4.5   |  17  |  0.7    Ca    7.7<L>      06 Nov 2021 01:46  Mg     2.5     11-06    TPro  5.8<L>  /  Alb  4.8  /  TBili  1.0  /  DBili  x   /  AST  17  /  ALT  10  /  AlkPhos  35  11-06    Creatinine, Serum: 0.7 mg/dL (11-06 @ 01:46)  Creatinine, Serum: 0.6 mg/dL (11-05 @ 15:56)  Creatinine, Serum: 0.9 mg/dL (11-04 @ 22:40)  Creatinine, Serum: 0.9 mg/dL (11-04 @ 08:48)    PT/INR - ( 05 Nov 2021 15:56 )   PT: 14.90 sec;   INR: 1.30 ratio         PTT - ( 05 Nov 2021 15:56 )  PTT:24.1 sec      HOSPITAL MEDICATIONS:  MEDICATIONS  (STANDING):  ALBUTerol    90 MICROgram(s) HFA Inhaler 2 Puff(s) Inhalation every 6 hours  aspirin enteric coated 325 milliGRAM(s) Oral daily  atorvastatin 40 milliGRAM(s) Oral at bedtime  bisacodyl 5 milliGRAM(s) Oral every 12 hours  ceFAZolin   IVPB 1000 milliGRAM(s) IV Intermittent every 8 hours  chlorhexidine 0.12% Liquid 5 milliLiter(s) Oral Mucosa two times a day  chlorhexidine 0.12% Liquid 15 milliLiter(s) Oral Mucosa every 12 hours  chlorhexidine 4% Liquid 1 Application(s) Topical <User Schedule>  colchicine 0.6 milliGRAM(s) Oral every 12 hours  dexMEDEtomidine Infusion 0.25 MICROgram(s)/kG/Hr (5.52 mL/Hr) IV Continuous <Continuous>  dextrose 50% Injectable 50 milliLiter(s) IV Push every 15 minutes  dextrose 50% Injectable 25 milliLiter(s) IV Push every 15 minutes  famotidine    Tablet 20 milliGRAM(s) Oral two times a day  guaiFENesin  milliGRAM(s) Oral every 12 hours  insulin regular Infusion 1 Unit(s)/Hr (1 mL/Hr) IV Continuous <Continuous>  ipratropium 17 MICROgram(s) HFA Inhaler 2 Puff(s) Inhalation every 6 hours  ketorolac   Injectable 30 milliGRAM(s) IV Push once  magnesium sulfate  IVPB 1 Gram(s) IV Intermittent every 12 hours  meperidine     Injectable 25 milliGRAM(s) IV Push once  metoprolol tartrate 12.5 milliGRAM(s) Oral every 12 hours  niCARdipine Infusion 5 mG/Hr (25 mL/Hr) IV Continuous <Continuous>  nitroglycerin  Infusion 5 MICROgram(s)/Min (1.5 mL/Hr) IV Continuous <Continuous>  norepinephrine Infusion 0.05 MICROgram(s)/kG/Min (8.28 mL/Hr) IV Continuous <Continuous>  polyethylene glycol 3350 17 Gram(s) Oral daily  propofol Infusion 15 MICROgram(s)/kG/Min (7.95 mL/Hr) IV Continuous <Continuous>  senna 2 Tablet(s) Oral at bedtime  sodium chloride 0.9%. 1000 milliLiter(s) (20 mL/Hr) IV Continuous <Continuous>  vasopressin Infusion 0.04 Unit(s)/Min (2.4 mL/Hr) IV Continuous <Continuous>    MEDICATIONS  (PRN):  acetaminophen     Tablet .. 650 milliGRAM(s) Oral every 6 hours PRN Temp greater or equal to 38C (100.4F), Mild Pain (1 - 3)  ondansetron  IVPB 4 milliGRAM(s) IV Intermittent once PRN Nausea and/or Vomiting  oxyCODONE    IR 10 milliGRAM(s) Oral every 4 hours PRN Severe Pain (7 - 10)  oxyCODONE    IR 5 milliGRAM(s) Oral every 4 hours PRN Moderate Pain (4 - 6)      REVIEW OF SYSTEMS:  CONSTITUTIONAL: [X] all negative; [ ] weakness, [ ] fevers, [ ] chills  EYES/ENT: [X] all negative; [ ] visual changes, [ ] vertigo, [ ] throat pain, [ ] eye pain  NECK: [X] all negative; [ ] pain, [ ] stiffness  RESPIRATORY: [ ] all negative, [x ] cough, [ ] wheezing, [ ] hemoptysis, [ ] shortness of breath, [ x ] chest pain  CARDIOVASCULAR: [X] all negative; [ ] anginal chest pain, [ ] palpitations, [ ] orthopnea  GASTROINTESTINAL: [X] all negative; [ ]abdominal pain, [ ] nausea, [ ] vomiting, [ ] hematemesis, [ ] diarrhea, [ ] constipation, [ ] melena, [ ] hematochezia.  GENITOURINARY: [X] all negative; [ ] dysuria, [ ] frequency, [ ] hematuria  NEUROLOGICAL: [X] all negative; [ ] numbness, [ ] weakness, [ ] paresthesias  MUSCULOSKELETAL: [X] all negative, [ ] joint pain, [ ] joint swelling, [ ] joint redness, [ ] bone pain  SKIN: [X] all negative; [ ] itching, [ ] burning, [ ] rashes, [ ] lesions   All other review of systems is negative unless indicated above.    [  ] Unable to assess ROS because     PHYSICAL EXAM:          CONSTITUTIONAL: Well-developed; well-nourished; in no acute distress.   	SKIN: warm, dry, no rashes or lesions  	HEENT: Atraumatic. Normocephalic. PERRL. Moist membranes, no conjunctival injection, sclera clear  	NECK: Supple; non tender.  No JVD. No lymphadenopathy.  	CARD: Normal S1, S2. Rate and Rhythm are regular. No murmurs.  	RESP: Good air entry bilaterally, no wheezes, no rales no rhonchi.  	ABD: Soft, not tender, not distended, no CVA tenderness, no rebound no guarding, bowel sounds present  	EXT: Normal ROM.  No clubbing, no cyanosis, no pedal edema, no calf pain b/l, Peripheral pulses intact.  	LYMPH: No acute adenopathy.  	NEURO: Alert, awake, motor 5/5 R, 5/5 L, sensation intact bilat, CN 2-12 intact,          PSYCH: Cooperative, appropriate. Alert & oriented x 3    RADIOLOGY:  X Reviewed and interpreted by me  CxR from 11-06-21 shows mild congestion, no pneumothorax, no effusion, no cardiomegaly,   S-G Catheter in place, Chest Tubes in place,     ECG:  X Reviewed and interpreted by me - NSR 78, QTc - 444, ST elevations

## 2021-11-07 LAB
ALBUMIN SERPL ELPH-MCNC: 4.4 G/DL — SIGNIFICANT CHANGE UP (ref 3.5–5.2)
ALP SERPL-CCNC: 48 U/L — SIGNIFICANT CHANGE UP (ref 30–115)
ALT FLD-CCNC: 26 U/L — SIGNIFICANT CHANGE UP (ref 0–41)
ANION GAP SERPL CALC-SCNC: 13 MMOL/L — SIGNIFICANT CHANGE UP (ref 7–14)
AST SERPL-CCNC: 36 U/L — SIGNIFICANT CHANGE UP (ref 0–41)
BASOPHILS # BLD AUTO: 0.03 K/UL — SIGNIFICANT CHANGE UP (ref 0–0.2)
BASOPHILS NFR BLD AUTO: 0.2 % — SIGNIFICANT CHANGE UP (ref 0–1)
BILIRUB SERPL-MCNC: 0.8 MG/DL — SIGNIFICANT CHANGE UP (ref 0.2–1.2)
BUN SERPL-MCNC: 25 MG/DL — HIGH (ref 10–20)
CALCIUM SERPL-MCNC: 8.3 MG/DL — LOW (ref 8.5–10.1)
CHLORIDE SERPL-SCNC: 103 MMOL/L — SIGNIFICANT CHANGE UP (ref 98–110)
CO2 SERPL-SCNC: 23 MMOL/L — SIGNIFICANT CHANGE UP (ref 17–32)
CREAT SERPL-MCNC: 0.8 MG/DL — SIGNIFICANT CHANGE UP (ref 0.7–1.5)
EOSINOPHIL # BLD AUTO: 0.01 K/UL — SIGNIFICANT CHANGE UP (ref 0–0.7)
EOSINOPHIL NFR BLD AUTO: 0.1 % — SIGNIFICANT CHANGE UP (ref 0–8)
GLUCOSE BLDC GLUCOMTR-MCNC: 166 MG/DL — HIGH (ref 70–99)
GLUCOSE BLDC GLUCOMTR-MCNC: 169 MG/DL — HIGH (ref 70–99)
GLUCOSE BLDC GLUCOMTR-MCNC: 186 MG/DL — HIGH (ref 70–99)
GLUCOSE BLDC GLUCOMTR-MCNC: 200 MG/DL — HIGH (ref 70–99)
GLUCOSE SERPL-MCNC: 158 MG/DL — HIGH (ref 70–99)
HCT VFR BLD CALC: 27.7 % — LOW (ref 42–52)
HGB BLD-MCNC: 9.4 G/DL — LOW (ref 14–18)
IMM GRANULOCYTES NFR BLD AUTO: 0.5 % — HIGH (ref 0.1–0.3)
LYMPHOCYTES # BLD AUTO: 1.49 K/UL — SIGNIFICANT CHANGE UP (ref 1.2–3.4)
LYMPHOCYTES # BLD AUTO: 11.2 % — LOW (ref 20.5–51.1)
MAGNESIUM SERPL-MCNC: 3.1 MG/DL — CRITICAL HIGH (ref 1.8–2.4)
MCHC RBC-ENTMCNC: 30.1 PG — SIGNIFICANT CHANGE UP (ref 27–31)
MCHC RBC-ENTMCNC: 33.9 G/DL — SIGNIFICANT CHANGE UP (ref 32–37)
MCV RBC AUTO: 88.8 FL — SIGNIFICANT CHANGE UP (ref 80–94)
MONOCYTES # BLD AUTO: 1.18 K/UL — HIGH (ref 0.1–0.6)
MONOCYTES NFR BLD AUTO: 8.9 % — SIGNIFICANT CHANGE UP (ref 1.7–9.3)
NEUTROPHILS # BLD AUTO: 10.48 K/UL — HIGH (ref 1.4–6.5)
NEUTROPHILS NFR BLD AUTO: 79.1 % — HIGH (ref 42.2–75.2)
NRBC # BLD: 0 /100 WBCS — SIGNIFICANT CHANGE UP (ref 0–0)
PLATELET # BLD AUTO: 135 K/UL — SIGNIFICANT CHANGE UP (ref 130–400)
POTASSIUM SERPL-MCNC: 4.3 MMOL/L — SIGNIFICANT CHANGE UP (ref 3.5–5)
POTASSIUM SERPL-SCNC: 4.3 MMOL/L — SIGNIFICANT CHANGE UP (ref 3.5–5)
PROT SERPL-MCNC: 5.9 G/DL — LOW (ref 6–8)
RBC # BLD: 3.12 M/UL — LOW (ref 4.7–6.1)
RBC # FLD: 12.5 % — SIGNIFICANT CHANGE UP (ref 11.5–14.5)
SODIUM SERPL-SCNC: 139 MMOL/L — SIGNIFICANT CHANGE UP (ref 135–146)
WBC # BLD: 13.26 K/UL — HIGH (ref 4.8–10.8)
WBC # FLD AUTO: 13.26 K/UL — HIGH (ref 4.8–10.8)

## 2021-11-07 PROCEDURE — 71045 X-RAY EXAM CHEST 1 VIEW: CPT | Mod: 26

## 2021-11-07 PROCEDURE — 93010 ELECTROCARDIOGRAM REPORT: CPT

## 2021-11-07 PROCEDURE — 99233 SBSQ HOSP IP/OBS HIGH 50: CPT

## 2021-11-07 RX ORDER — POTASSIUM CHLORIDE 20 MEQ
20 PACKET (EA) ORAL ONCE
Refills: 0 | Status: COMPLETED | OUTPATIENT
Start: 2021-11-07 | End: 2021-11-07

## 2021-11-07 RX ORDER — FUROSEMIDE 40 MG
20 TABLET ORAL ONCE
Refills: 0 | Status: COMPLETED | OUTPATIENT
Start: 2021-11-07 | End: 2021-11-07

## 2021-11-07 RX ADMIN — Medication 20 MILLIGRAM(S): at 09:46

## 2021-11-07 RX ADMIN — POLYETHYLENE GLYCOL 3350 17 GRAM(S): 17 POWDER, FOR SOLUTION ORAL at 11:36

## 2021-11-07 RX ADMIN — Medication 600 MILLIGRAM(S): at 17:05

## 2021-11-07 RX ADMIN — Medication 325 MILLIGRAM(S): at 11:37

## 2021-11-07 RX ADMIN — OXYCODONE HYDROCHLORIDE 10 MILLIGRAM(S): 5 TABLET ORAL at 18:56

## 2021-11-07 RX ADMIN — OXYCODONE HYDROCHLORIDE 10 MILLIGRAM(S): 5 TABLET ORAL at 14:00

## 2021-11-07 RX ADMIN — Medication 5 MILLIGRAM(S): at 05:23

## 2021-11-07 RX ADMIN — ENOXAPARIN SODIUM 40 MILLIGRAM(S): 100 INJECTION SUBCUTANEOUS at 11:34

## 2021-11-07 RX ADMIN — OXYCODONE HYDROCHLORIDE 10 MILLIGRAM(S): 5 TABLET ORAL at 13:30

## 2021-11-07 RX ADMIN — Medication 0.6 MILLIGRAM(S): at 17:04

## 2021-11-07 RX ADMIN — OXYCODONE HYDROCHLORIDE 10 MILLIGRAM(S): 5 TABLET ORAL at 05:30

## 2021-11-07 RX ADMIN — ATORVASTATIN CALCIUM 40 MILLIGRAM(S): 80 TABLET, FILM COATED ORAL at 22:37

## 2021-11-07 RX ADMIN — Medication 20 MILLIEQUIVALENT(S): at 09:46

## 2021-11-07 RX ADMIN — OXYCODONE HYDROCHLORIDE 10 MILLIGRAM(S): 5 TABLET ORAL at 19:30

## 2021-11-07 RX ADMIN — Medication 12.5 MILLIGRAM(S): at 11:34

## 2021-11-07 RX ADMIN — METFORMIN HYDROCHLORIDE 500 MILLIGRAM(S): 850 TABLET ORAL at 05:35

## 2021-11-07 RX ADMIN — SENNA PLUS 2 TABLET(S): 8.6 TABLET ORAL at 22:37

## 2021-11-07 RX ADMIN — FAMOTIDINE 20 MILLIGRAM(S): 10 INJECTION INTRAVENOUS at 17:05

## 2021-11-07 RX ADMIN — Medication 0.6 MILLIGRAM(S): at 05:23

## 2021-11-07 RX ADMIN — Medication 600 MILLIGRAM(S): at 05:23

## 2021-11-07 RX ADMIN — Medication 2: at 16:26

## 2021-11-07 RX ADMIN — Medication 2: at 11:35

## 2021-11-07 RX ADMIN — FAMOTIDINE 20 MILLIGRAM(S): 10 INJECTION INTRAVENOUS at 05:23

## 2021-11-07 RX ADMIN — Medication 5 MILLIGRAM(S): at 17:05

## 2021-11-07 RX ADMIN — Medication 2: at 07:46

## 2021-11-07 RX ADMIN — METFORMIN HYDROCHLORIDE 500 MILLIGRAM(S): 850 TABLET ORAL at 17:04

## 2021-11-07 RX ADMIN — Medication 12.5 MILLIGRAM(S): at 22:37

## 2021-11-07 NOTE — PROGRESS NOTE ADULT - SUBJECTIVE AND OBJECTIVE BOX
OPERATIVE PROCEDURE(s):                POD #                       61yMale  SURGEON(s): JENNIFER Rene  SUBJECTIVE ASSESSMENT:   Vital Signs Last 24 Hrs  T(F): 97.9 (2021 04:00), Max: 100 (2021 12:00)  HR: 76 (2021 06:00) (68 - 79)  BP: 130/65 (2021 06:00) (85/52 - 137/66)  BP(mean): 89 (2021 06:00) (62 - 94)  ABP: 133/44 (2021 14:30) (125/50 - 157/50)  ABP(mean): 63 (2021 14:30)  RR: 23 (2021 06:00) (15 - 38)  SpO2: 96% (2021 06:00) (92% - 100%)  CVP(mm Hg): 3 (2021 10:15)  CVP(cm H2O): --  CO: 5.4 (2021 07:30)  CI: 2.7 (2021 07:30)  PA: 25/8 (2021 10:15)  SVR: 872 (2021 07:30)    I&O's Detail    2021 08:01  -  2021 07:00  --------------------------------------------------------  IN:    Insulin: 5 mL    IV PiggyBack: 150 mL    Oral Fluid: 1320 mL    sodium chloride 0.9%: 60 mL  Total IN: 1535 mL    OUT:    Chest Tube (mL): 15 mL    Chest Tube (mL): 47 mL    Chest Tube (mL): 32 mL    Indwelling Catheter - Urethral (mL): 1760 mL  Total OUT: 1854 mL        Net:   I&O's Detail    2021 07:01  -  2021 07:00  --------------------------------------------------------  Total NET: 378.2 mL      2021 08:01  -  2021 07:00  --------------------------------------------------------  Total NET: -319 mL        CAPILLARY BLOOD GLUCOSE  207 (2021 21:00)  129 (2021 06:30)  118 (2021 04:00)      POCT Blood Glucose.: 186 mg/dL (2021 06:50)  POCT Blood Glucose.: 207 mg/dL (2021 21:39)  POCT Blood Glucose.: 245 mg/dL (2021 15:55)  POCT Blood Glucose.: 177 mg/dL (2021 11:30)  POCT Blood Glucose.: 165 mg/dL (2021 08:03)    Physical Exam:  General: NAD; A&Ox3/Patient is intubated and sedated  Cardiac: S1/S2, RRR, no murmur, no rubs  Lungs: unlabored respirations, CTA b/l, no wheeze, no rales, no crackles  Abdomen: Soft/NT/ND; positive bowel sounds x 4  Sternum: Intact, no click, incision healing well with no drainage  Incisions: Incisions clean/dry/intact  Extremities: No edema b/l lower extremities; good capillary refill; no cyanosis; palpable 1+ pedal pulses b/l    Central Venous Catheter: Yes[]  No[] , If Yes indication:           Day #  Zamora Catheter: Yes  [] , No  [] , If yes indication:                      Day #  NGT: Yes [] No [] ,    If Yes Placement:                                     Day #  EPICARDIAL WIRES:  [] YES [] NO                                              Day #  BOWEL MOVEMENT:  [] YES [] NO, If No, Timing since last BM:      Day #  CHEST TUBE(Left/Right):  [] YES [] NO, If yes -  AIR LEAKS:  [] YES [] NO        LABS:                        9.4<L>  13.26<H> )-----------( 135      ( 2021 01:16 )             27.7<L>                        9.8<L>  14.29<H> )-----------( 149      ( 2021 13:40 )             28.9<L>        139  |  103  |  25<H>  ----------------------------<  158<H>  4.3   |  23  |  0.8      142  |  105  |  19  ----------------------------<  184<H>  4.2   |  18  |  0.8    Ca    8.3<L>      2021 01:16  Mg     3.1         TPro  5.9<L> [6.0 - 8.0]  /  Alb  4.4 [3.5 - 5.2]  /  TBili  0.8 [0.2 - 1.2]  /  DBili  x   /  AST  36 [0 - 41]  /  ALT  26 [0 - 41]  /  AlkPhos  48 [30 - 115]      PT/INR - ( 2021 15:56 )   PT: ;   INR: 1.30 ratio         PTT - ( 2021 15:56 )  PTT:24.1 sec  Urinalysis Basic - ( 2021 10:55 )    Color: Light Yellow / Appearance: Clear / S.048 / pH: x  Gluc: x / Ketone: Negative  / Bili: Negative / Urobili: <2 mg/dL   Blood: x / Protein: Negative / Nitrite: Negative   Leuk Esterase: Negative / RBC: x / WBC x   Sq Epi: x / Non Sq Epi: x / Bacteria: x      ABG - ( 2021 09:45 )  pH: 7.40  /  pCO2: 33    /  pO2: 96    / HCO3: 20    / Base Excess: -3.8  /  SaO2: 98.4  /  LA: 1.40             RADIOLOGY & ADDITIONAL TESTS:  CXR:  EKG:  MEDICATIONS  (STANDING):  albuterol/ipratropium for Nebulization 3 milliLiter(s) Nebulizer every 6 hours  aspirin enteric coated 325 milliGRAM(s) Oral daily  atorvastatin 40 milliGRAM(s) Oral at bedtime  bisacodyl 5 milliGRAM(s) Oral every 12 hours  chlorhexidine 4% Liquid 1 Application(s) Topical <User Schedule>  colchicine 0.6 milliGRAM(s) Oral every 12 hours  dextrose 40% Gel 15 Gram(s) Oral once  dextrose 5%. 1000 milliLiter(s) (50 mL/Hr) IV Continuous <Continuous>  dextrose 5%. 1000 milliLiter(s) (100 mL/Hr) IV Continuous <Continuous>  dextrose 50% Injectable 50 milliLiter(s) IV Push every 15 minutes  dextrose 50% Injectable 25 milliLiter(s) IV Push every 15 minutes  enoxaparin Injectable 40 milliGRAM(s) SubCutaneous daily  famotidine    Tablet 20 milliGRAM(s) Oral two times a day  glucagon  Injectable 1 milliGRAM(s) IntraMuscular once  guaiFENesin  milliGRAM(s) Oral every 12 hours  insulin lispro (ADMELOG) corrective regimen sliding scale   SubCutaneous three times a day before meals  magnesium sulfate  IVPB 1 Gram(s) IV Intermittent every 12 hours  metFORMIN 500 milliGRAM(s) Oral two times a day  metoprolol tartrate 12.5 milliGRAM(s) Oral every 12 hours  polyethylene glycol 3350 17 Gram(s) Oral daily  senna 2 Tablet(s) Oral at bedtime  sodium chloride 0.9%. 1000 milliLiter(s) (20 mL/Hr) IV Continuous <Continuous>    MEDICATIONS  (PRN):  acetaminophen     Tablet .. 650 milliGRAM(s) Oral every 6 hours PRN Temp greater or equal to 38C (100.4F), Mild Pain (1 - 3)  ondansetron  IVPB 4 milliGRAM(s) IV Intermittent once PRN Nausea and/or Vomiting  oxyCODONE    IR 10 milliGRAM(s) Oral every 4 hours PRN Severe Pain (7 - 10)  oxyCODONE    IR 5 milliGRAM(s) Oral every 4 hours PRN Moderate Pain (4 - 6)    HEPARIN:  [] YES [] NO  Dose: XX UNITS/HR UNITS Q8H  LOVENOX:[] YES [] NO  Dose: XX mg Q24H  COUMADIN: []  YES [] NO  Dose: XX mg  Q24H  SCD's: YES b/l  GI Prophylaxis: Protonix [], Pepcid [], None [], (Contra-indication:.....)    Post-Op Beta-Blockers: Yes [], No[], If No, then contraindication:  Post-Op Aspirin: Yes [],  No [], If No, then contraindication:  Post-Op Statin: Yes [], No[], If No, then contraindication:  Allergies    No Known Allergies    Intolerances      Ambulation/Activity Status:    Assessment/Plan:  61y Male status-post .....  - Case and plan discussed with CTU Intensivist and CT Surgeon - Dr. Gannon/Nitza/Bakari  - Continue CTU supportive care    - Continue DVT/GI prophylaxis  - Incentive Spirometry 10 times an hour  - Continue to advance physical activity as tolerated and continue PT/OT as directed  1. CAD: Continue ASA, statin, BB  2. HTN:   3. A. Fib:   4. COPD/Hypoxia:   5. DM/Glucose Control:     Social Service Disposition:     OPERATIVE PROCEDURE(s):   CABGx5 + Left radial              POD #  2                     61yMale  SURGEON(s): Dr Goddard  SUBJECTIVE ASSESSMENT: pt seen and examined. no acute complaints at this time  Vital Signs Last 24 Hrs  T(F): 97.9 (2021 04:00), Max: 100 (2021 12:00)  HR: 76 (2021 06:00) (68 - 79)  BP: 130/65 (2021 06:00) (85/52 - 137/66)  BP(mean): 89 (2021 06:00) (62 - 94)  ABP: 133/44 (2021 14:30) (125/50 - 157/50)  ABP(mean): 63 (2021 14:30)  RR: 23 (2021 06:00) (15 - 38)  SpO2: 96% (2021 06:00) (92% - 100%)  CVP(mm Hg): 3 (2021 10:15)  CVP(cm H2O): --  CO: 5.4 (2021 07:30)  CI: 2.7 (2021 07:30)  PA: 25/8 (2021 10:15)  SVR: 872 (2021 07:30)    I&O's Detail    2021 08:01  -  2021 07:00  --------------------------------------------------------  IN:    Insulin: 5 mL    IV PiggyBack: 150 mL    Oral Fluid: 1320 mL    sodium chloride 0.9%: 60 mL  Total IN: 1535 mL    OUT:    Chest Tube (mL): 15 mL    Chest Tube (mL): 47 mL    Chest Tube (mL): 32 mL    Indwelling Catheter - Urethral (mL): 1760 mL  Total OUT: 1854 mL        Net:   I&O's Detail    2021 07:01  -  2021 07:00  --------------------------------------------------------  Total NET: 378.2 mL      2021 08:01  -  2021 07:00  --------------------------------------------------------  Total NET: -319 mL        CAPILLARY BLOOD GLUCOSE  207 (2021 21:00)  129 (2021 06:30)  118 (2021 04:00)      POCT Blood Glucose.: 186 mg/dL (2021 06:50)  POCT Blood Glucose.: 207 mg/dL (2021 21:39)  POCT Blood Glucose.: 245 mg/dL (2021 15:55)  POCT Blood Glucose.: 177 mg/dL (2021 11:30)  POCT Blood Glucose.: 165 mg/dL (2021 08:03)    Physical Exam:  General: NAD; A&Ox3  Cardiac: S1/S2, RRR, no murmur, no rubs  Lungs: unlabored respirations, CTA b/l, no wheeze, no rales, no crackles  Abdomen: Soft/NT/ND; positive bowel sounds x 4  Sternum: Intact, no click, incision healing well with no drainage  Incisions: Incisions clean/dry/intact  Extremities: No edema b/l lower extremities; good capillary refill; no cyanosis; palpable 1+ pedal pulses b/l    Central Venous Catheter: Yes[x]  No[] , If Yes indication:    access       Day # 2  Zamora Catheter: Yes  [x] , No  [] , If yes indication:  strict i.o                    Day #2  NGT: Yes [] No [x] ,    If Yes Placement:                                     Day #  EPICARDIAL WIRES:  [x] YES [] NO                                              Day #2  BOWEL MOVEMENT:  [] YES [x] NO, If No, Timing since last BM:      Day #  CHEST TUBE(Left/Right):  [] YES [x] NO, If yes -  AIR LEAKS:  [] YES [] NO        LABS:                        9.4<L>  13.26<H> )-----------( 135      ( 2021 01:16 )             27.7<L>                        9.8<L>  14.29<H> )-----------( 149      ( 2021 13:40 )             28.9<L>        139  |  103  |  25<H>  ----------------------------<  158<H>  4.3   |  23  |  0.8      142  |  105  |  19  ----------------------------<  184<H>  4.2   |  18  |  0.8    Ca    8.3<L>      2021 01:16  Mg     3.1         TPro  5.9<L> [6.0 - 8.0]  /  Alb  4.4 [3.5 - 5.2]  /  TBili  0.8 [0.2 - 1.2]  /  DBili  x   /  AST  36 [0 - 41]  /  ALT  26 [0 - 41]  /  AlkPhos  48 [30 - 115]      PT/INR - ( 2021 15:56 )   PT: ;   INR: 1.30 ratio         PTT - ( 2021 15:56 )  PTT:24.1 sec  Urinalysis Basic - ( 2021 10:55 )    Color: Light Yellow / Appearance: Clear / S.048 / pH: x  Gluc: x / Ketone: Negative  / Bili: Negative / Urobili: <2 mg/dL   Blood: x / Protein: Negative / Nitrite: Negative   Leuk Esterase: Negative / RBC: x / WBC x   Sq Epi: x / Non Sq Epi: x / Bacteria: x      ABG - ( 2021 09:45 )  pH: 7.40  /  pCO2: 33    /  pO2: 96    / HCO3: 20    / Base Excess: -3.8  /  SaO2: 98.4  /  LA: 1.40             RADIOLOGY & ADDITIONAL TESTS:  CXR: < from: Xray Chest 1 View-PORTABLE IMMEDIATE (Xray Chest 1 View-PORTABLE IMMEDIATE .) (21 @ 15:57) >    IMPRESSION:    Interval removal of left chest tube. No pneumothorax.    Interval removal of Sutton-Giorgio catheter.    < end of copied text >    EKG: < from: 12 Lead ECG (21 @ 08:25) >  Ventricular Rate 73 BPM    Atrial Rate 73 BPM    P-R Interval 168 ms    QRS Duration 88 ms    Q-T Interval 386 ms    QTC Calculation(Bazett) 425 ms    P Axis 14 degrees    R Axis -15 degrees    T Axis -13 degrees    Diagnosis Line Normal sinus rhythm  Inferior infarct , possibly acute  Anterolateral injury pattern    Abnormal ECG    < end of copied text >    MEDICATIONS  (STANDING):  albuterol/ipratropium for Nebulization 3 milliLiter(s) Nebulizer every 6 hours  aspirin enteric coated 325 milliGRAM(s) Oral daily  atorvastatin 40 milliGRAM(s) Oral at bedtime  bisacodyl 5 milliGRAM(s) Oral every 12 hours  chlorhexidine 4% Liquid 1 Application(s) Topical <User Schedule>  colchicine 0.6 milliGRAM(s) Oral every 12 hours  dextrose 40% Gel 15 Gram(s) Oral once  dextrose 5%. 1000 milliLiter(s) (50 mL/Hr) IV Continuous <Continuous>  dextrose 5%. 1000 milliLiter(s) (100 mL/Hr) IV Continuous <Continuous>  dextrose 50% Injectable 50 milliLiter(s) IV Push every 15 minutes  dextrose 50% Injectable 25 milliLiter(s) IV Push every 15 minutes  enoxaparin Injectable 40 milliGRAM(s) SubCutaneous daily  famotidine    Tablet 20 milliGRAM(s) Oral two times a day  glucagon  Injectable 1 milliGRAM(s) IntraMuscular once  guaiFENesin  milliGRAM(s) Oral every 12 hours  insulin lispro (ADMELOG) corrective regimen sliding scale   SubCutaneous three times a day before meals  magnesium sulfate  IVPB 1 Gram(s) IV Intermittent every 12 hours  metFORMIN 500 milliGRAM(s) Oral two times a day  metoprolol tartrate 12.5 milliGRAM(s) Oral every 12 hours  polyethylene glycol 3350 17 Gram(s) Oral daily  senna 2 Tablet(s) Oral at bedtime  sodium chloride 0.9%. 1000 milliLiter(s) (20 mL/Hr) IV Continuous <Continuous>    MEDICATIONS  (PRN):  acetaminophen     Tablet .. 650 milliGRAM(s) Oral every 6 hours PRN Temp greater or equal to 38C (100.4F), Mild Pain (1 - 3)  ondansetron  IVPB 4 milliGRAM(s) IV Intermittent once PRN Nausea and/or Vomiting  oxyCODONE    IR 10 milliGRAM(s) Oral every 4 hours PRN Severe Pain (7 - 10)  oxyCODONE    IR 5 milliGRAM(s) Oral every 4 hours PRN Moderate Pain (4 - 6)      LOVENOX:[x] YES [] NO  Dose: 40 mg Q24H    SCD's: YES b/l  GI Prophylaxis: Protonix [], Pepcid [x], None [], (Contra-indication:.....)    Post-Op Beta-Blockers: Yes [x], No[], If No, then contraindication:  Post-Op Aspirin: Yes [x],  No [], If No, then contraindication:  Post-Op Statin: Yes [x], No[], If No, then contraindication:  Allergies    No Known Allergies    Intolerances      Ambulation/Activity Status: ambulate     Assessment/Plan:  61y Male status-post CABG with radial and [place]    POD#2  - Case and plan discussed with CTU Intensivist and CT Surgeon - Dr. Villegas  - Continue CTU supportive care    - Continue DVT prophylaxis  - Continue GI prophylaxis  - Incentive Spirometry 10 times an hour  - Continue to advance physical activity as tolerated and continue PT/OT as directed  - CAD: continue  ASA, start statin, BB  - Anemia secondary to:  Acute PO blood loss anemia,     stable         track and trend H/H  - A. Fib:  prophylaxis with magnesium, lopressor   - COPD/Hypoxia: mild Po respiratory insufficiency - continue supplemental O2 wean to room air and duonebs  - DM/Glucose Control: cont RISS, metformin  - respiratory acidosis received sodium bicarb- resolved   - Elevated ST segements on EKG, consistent with pericarditis - cont colchicine.   - cont Amlodipine 2.5 mg for radial artery harvest if blood pressure can tolerate, on low side today with initiation of BB      dispo: home when stable

## 2021-11-07 NOTE — PROGRESS NOTE ADULT - SUBJECTIVE AND OBJECTIVE BOX
JOVANNY BENITO  MRN#: [id number]  Subjective:  Patient was seen and evalauted on AM rounds offerring no specific compliants at this time.    OBJECTIVE:  ICU Vital Signs Last 24 Hrs  T(C): 36.5 (2021 12:00), Max: 37.8 (2021 16:00)  T(F): 97.7 (2021 12:00), Max: 100 (2021 16:00)  HR: 84 (2021 12:00) (68 - 84)  BP: 128/69 (2021 12:00) (85/52 - 152/78)  BP(mean): 93 (2021 12:00) (62 - 108)  ABP: 133/44 (2021 14:30) (132/46 - 157/50)  ABP(mean): 63 (2021 14:30) (63 - 71)  RR: 27 (2021 12:) (15 - 38)  SpO2: 94% (2021 12:00) (92% - 100%)       @ 08: @ 07:00  --------------------------------------------------------  IN: 1535 mL / OUT: 1854 mL / NET: -319 mL     @ 07: @ 12:36  --------------------------------------------------------  IN: 236 mL / OUT: 550 mL / NET: -314 mL      CAPILLARY BLOOD GLUCOSE  187 (2021 07:00)      POCT Blood Glucose.: 200 mg/dL (2021 11:22)      PHYSICAL EXAM:Daily     Daily Weight in k.6 (2021 05:00)  General: WN/WD NAD    HEENT:     + NCAT  + EOMI  - Conjuctival edema   - Icterus   - Thrush   - ETT  - NGT/OGT    Neck:         + FROM    - JVD     - Nodes     - Masses    + Mid-line trachea   - Tracheostomy    Chest:         - Sternal click  - Sternal drainage  + Pacing wires  + Chest tubes  - SubQ emphysema    Lungs:          + CTA   - Rhonchi    - Rales    - Wheezing     - Decreased BS   - Dullness R L    Cardiac:       + S1 + S2    + RRR   - Irregular   - S3  - S4    - Murmurs   - Rub   - Hamman’s sign     Abdomen:    + BS     + Soft    + Non-tender     - Distended    - Organomegaly  - PEG    Extremities:   - Cyanosis U/L   - Clubbing  U/L  + LE Edema   + Capillary refill    + Pulses     Neuro:        + Awake   +  Alert   - Confused   - Lethargic   - Sedated   - Generalized Weakness    Skin:        - Rashes    - Erythema   + Normal incisions   + IV sites intact  - Sacral decubitus    HOSPITAL MEDICATIONS:  MEDICATIONS  (STANDING):  albuterol/ipratropium for Nebulization 3 milliLiter(s) Nebulizer every 6 hours  aspirin enteric coated 325 milliGRAM(s) Oral daily  atorvastatin 40 milliGRAM(s) Oral at bedtime  bisacodyl 5 milliGRAM(s) Oral every 12 hours  chlorhexidine 4% Liquid 1 Application(s) Topical <User Schedule>  colchicine 0.6 milliGRAM(s) Oral every 12 hours  dextrose 40% Gel 15 Gram(s) Oral once  dextrose 5%. 1000 milliLiter(s) (50 mL/Hr) IV Continuous <Continuous>  dextrose 5%. 1000 milliLiter(s) (100 mL/Hr) IV Continuous <Continuous>  dextrose 50% Injectable 50 milliLiter(s) IV Push every 15 minutes  dextrose 50% Injectable 25 milliLiter(s) IV Push every 15 minutes  enoxaparin Injectable 40 milliGRAM(s) SubCutaneous daily  famotidine    Tablet 20 milliGRAM(s) Oral two times a day  glucagon  Injectable 1 milliGRAM(s) IntraMuscular once  guaiFENesin  milliGRAM(s) Oral every 12 hours  insulin lispro (ADMELOG) corrective regimen sliding scale   SubCutaneous three times a day before meals  magnesium sulfate  IVPB 1 Gram(s) IV Intermittent every 12 hours  metFORMIN 500 milliGRAM(s) Oral two times a day  metoprolol tartrate 12.5 milliGRAM(s) Oral every 12 hours  polyethylene glycol 3350 17 Gram(s) Oral daily  senna 2 Tablet(s) Oral at bedtime  sodium chloride 0.9%. 1000 milliLiter(s) (20 mL/Hr) IV Continuous <Continuous>    MEDICATIONS  (PRN):  acetaminophen     Tablet .. 650 milliGRAM(s) Oral every 6 hours PRN Temp greater or equal to 38C (100.4F), Mild Pain (1 - 3)  ondansetron  IVPB 4 milliGRAM(s) IV Intermittent once PRN Nausea and/or Vomiting  oxyCODONE    IR 10 milliGRAM(s) Oral every 4 hours PRN Severe Pain (7 - 10)  oxyCODONE    IR 5 milliGRAM(s) Oral every 4 hours PRN Moderate Pain (4 - 6)      LABS:                        9.4    13.26 )-----------( 135      ( 2021 01:16 )             27.7        139  |  103  |  25<H>  ----------------------------<  158<H>  4.3   |  23  |  0.8    Ca    8.3<L>      2021 01:16  Mg     3.1         TPro  5.9<L>  /  Alb  4.4  /  TBili  0.8  /  DBili  x   /  AST  36  /  ALT  26  /  AlkPhos  48  1107    PT/INR - ( 2021 15:56 )   PT: 14.90 sec;   INR: 1.30 ratio         PTT - ( 2021 15:56 )  PTT:24.1 sec LIVER FUNCTIONS - ( 2021 01:16 )  Alb: 4.4 g/dL / Pro: 5.9 g/dL / ALK PHOS: 48 U/L / ALT: 26 U/L / AST: 36 U/L / GGT: x               RADIOLOGY:  X Reviewed and interpreted by me: bibasilar opacities    CARDIOPULMONARY DYSFUNCTION  - Respiratory status required supplemental oxygen & the following of continuous pulse oximetry for support & to prevent decompensation  - Continued early mobilization as tolerated  - Addressed analgesic regimen to optimize function    PREVENTION-PROPHYLAXIS  - ASA continued for graft occlusion-thromboembolism prophylaxis  - Lipitor was also started for long term graft patency  - Lovenox continued for VTE prophylaxis in addition to Venodyne boots  - Pepcid maintained for GI bleeding prophylaxis  - Lopressor continued for atrial fibrillation prophylaxis  - Metabolic stability & infection prophylaxis required review and adjustment of regular Insulin sliding scale and gylcemic regimen while following serial glucose levels to help achieve & maintain euglycemia  - Reviewed & addressed surgical site infection prophylaxis regimen

## 2021-11-08 LAB
ALBUMIN SERPL ELPH-MCNC: 4.1 G/DL — SIGNIFICANT CHANGE UP (ref 3.5–5.2)
ALP SERPL-CCNC: 59 U/L — SIGNIFICANT CHANGE UP (ref 30–115)
ALT FLD-CCNC: 27 U/L — SIGNIFICANT CHANGE UP (ref 0–41)
ANION GAP SERPL CALC-SCNC: 14 MMOL/L — SIGNIFICANT CHANGE UP (ref 7–14)
AST SERPL-CCNC: 27 U/L — SIGNIFICANT CHANGE UP (ref 0–41)
BASOPHILS # BLD AUTO: 0.04 K/UL — SIGNIFICANT CHANGE UP (ref 0–0.2)
BASOPHILS NFR BLD AUTO: 0.3 % — SIGNIFICANT CHANGE UP (ref 0–1)
BILIRUB SERPL-MCNC: 0.8 MG/DL — SIGNIFICANT CHANGE UP (ref 0.2–1.2)
BUN SERPL-MCNC: 28 MG/DL — HIGH (ref 10–20)
CALCIUM SERPL-MCNC: 8.2 MG/DL — LOW (ref 8.5–10.1)
CHLORIDE SERPL-SCNC: 101 MMOL/L — SIGNIFICANT CHANGE UP (ref 98–110)
CO2 SERPL-SCNC: 24 MMOL/L — SIGNIFICANT CHANGE UP (ref 17–32)
CREAT SERPL-MCNC: 0.7 MG/DL — SIGNIFICANT CHANGE UP (ref 0.7–1.5)
EOSINOPHIL # BLD AUTO: 0.04 K/UL — SIGNIFICANT CHANGE UP (ref 0–0.7)
EOSINOPHIL NFR BLD AUTO: 0.3 % — SIGNIFICANT CHANGE UP (ref 0–8)
GLUCOSE BLDC GLUCOMTR-MCNC: 144 MG/DL — HIGH (ref 70–99)
GLUCOSE BLDC GLUCOMTR-MCNC: 146 MG/DL — HIGH (ref 70–99)
GLUCOSE BLDC GLUCOMTR-MCNC: 148 MG/DL — HIGH (ref 70–99)
GLUCOSE BLDC GLUCOMTR-MCNC: 148 MG/DL — HIGH (ref 70–99)
GLUCOSE SERPL-MCNC: 125 MG/DL — HIGH (ref 70–99)
HCT VFR BLD CALC: 27.2 % — LOW (ref 42–52)
HGB BLD-MCNC: 9.1 G/DL — LOW (ref 14–18)
IMM GRANULOCYTES NFR BLD AUTO: 0.4 % — HIGH (ref 0.1–0.3)
LYMPHOCYTES # BLD AUTO: 15.2 % — LOW (ref 20.5–51.1)
LYMPHOCYTES # BLD AUTO: 2.11 K/UL — SIGNIFICANT CHANGE UP (ref 1.2–3.4)
MAGNESIUM SERPL-MCNC: 2.5 MG/DL — HIGH (ref 1.8–2.4)
MCHC RBC-ENTMCNC: 29.7 PG — SIGNIFICANT CHANGE UP (ref 27–31)
MCHC RBC-ENTMCNC: 33.5 G/DL — SIGNIFICANT CHANGE UP (ref 32–37)
MCV RBC AUTO: 88.9 FL — SIGNIFICANT CHANGE UP (ref 80–94)
MONOCYTES # BLD AUTO: 1.34 K/UL — HIGH (ref 0.1–0.6)
MONOCYTES NFR BLD AUTO: 9.6 % — HIGH (ref 1.7–9.3)
NEUTROPHILS # BLD AUTO: 10.33 K/UL — HIGH (ref 1.4–6.5)
NEUTROPHILS NFR BLD AUTO: 74.2 % — SIGNIFICANT CHANGE UP (ref 42.2–75.2)
NRBC # BLD: 0 /100 WBCS — SIGNIFICANT CHANGE UP (ref 0–0)
PLATELET # BLD AUTO: 139 K/UL — SIGNIFICANT CHANGE UP (ref 130–400)
POTASSIUM SERPL-MCNC: 4.4 MMOL/L — SIGNIFICANT CHANGE UP (ref 3.5–5)
POTASSIUM SERPL-SCNC: 4.4 MMOL/L — SIGNIFICANT CHANGE UP (ref 3.5–5)
PROT SERPL-MCNC: 6 G/DL — SIGNIFICANT CHANGE UP (ref 6–8)
RBC # BLD: 3.06 M/UL — LOW (ref 4.7–6.1)
RBC # FLD: 12.4 % — SIGNIFICANT CHANGE UP (ref 11.5–14.5)
SODIUM SERPL-SCNC: 139 MMOL/L — SIGNIFICANT CHANGE UP (ref 135–146)
WBC # BLD: 13.91 K/UL — HIGH (ref 4.8–10.8)
WBC # FLD AUTO: 13.91 K/UL — HIGH (ref 4.8–10.8)

## 2021-11-08 PROCEDURE — 99232 SBSQ HOSP IP/OBS MODERATE 35: CPT | Mod: 25

## 2021-11-08 PROCEDURE — 32555 ASPIRATE PLEURA W/ IMAGING: CPT

## 2021-11-08 PROCEDURE — 71045 X-RAY EXAM CHEST 1 VIEW: CPT | Mod: 26,76

## 2021-11-08 PROCEDURE — 93010 ELECTROCARDIOGRAM REPORT: CPT

## 2021-11-08 RX ORDER — POTASSIUM CHLORIDE 20 MEQ
20 PACKET (EA) ORAL DAILY
Refills: 0 | Status: COMPLETED | OUTPATIENT
Start: 2021-11-08 | End: 2021-11-09

## 2021-11-08 RX ORDER — FUROSEMIDE 40 MG
40 TABLET ORAL DAILY
Refills: 0 | Status: DISCONTINUED | OUTPATIENT
Start: 2021-11-08 | End: 2021-11-09

## 2021-11-08 RX ORDER — ONDANSETRON 8 MG/1
4 TABLET, FILM COATED ORAL ONCE
Refills: 0 | Status: COMPLETED | OUTPATIENT
Start: 2021-11-08 | End: 2021-11-08

## 2021-11-08 RX ADMIN — METFORMIN HYDROCHLORIDE 500 MILLIGRAM(S): 850 TABLET ORAL at 05:19

## 2021-11-08 RX ADMIN — Medication 100 GRAM(S): at 17:18

## 2021-11-08 RX ADMIN — OXYCODONE HYDROCHLORIDE 10 MILLIGRAM(S): 5 TABLET ORAL at 21:21

## 2021-11-08 RX ADMIN — METFORMIN HYDROCHLORIDE 500 MILLIGRAM(S): 850 TABLET ORAL at 17:19

## 2021-11-08 RX ADMIN — Medication 325 MILLIGRAM(S): at 11:41

## 2021-11-08 RX ADMIN — Medication 600 MILLIGRAM(S): at 05:19

## 2021-11-08 RX ADMIN — ONDANSETRON 104 MILLIGRAM(S): 8 TABLET, FILM COATED ORAL at 03:50

## 2021-11-08 RX ADMIN — Medication 40 MILLIGRAM(S): at 08:52

## 2021-11-08 RX ADMIN — Medication 20 MILLIEQUIVALENT(S): at 08:53

## 2021-11-08 RX ADMIN — OXYCODONE HYDROCHLORIDE 10 MILLIGRAM(S): 5 TABLET ORAL at 14:44

## 2021-11-08 RX ADMIN — Medication 100 GRAM(S): at 05:19

## 2021-11-08 RX ADMIN — CHLORHEXIDINE GLUCONATE 1 APPLICATION(S): 213 SOLUTION TOPICAL at 05:19

## 2021-11-08 RX ADMIN — Medication 600 MILLIGRAM(S): at 17:18

## 2021-11-08 RX ADMIN — Medication 5 MILLIGRAM(S): at 05:19

## 2021-11-08 RX ADMIN — OXYCODONE HYDROCHLORIDE 10 MILLIGRAM(S): 5 TABLET ORAL at 10:10

## 2021-11-08 RX ADMIN — Medication 0.6 MILLIGRAM(S): at 05:19

## 2021-11-08 RX ADMIN — OXYCODONE HYDROCHLORIDE 10 MILLIGRAM(S): 5 TABLET ORAL at 03:30

## 2021-11-08 RX ADMIN — SENNA PLUS 2 TABLET(S): 8.6 TABLET ORAL at 21:19

## 2021-11-08 RX ADMIN — POLYETHYLENE GLYCOL 3350 17 GRAM(S): 17 POWDER, FOR SOLUTION ORAL at 11:41

## 2021-11-08 RX ADMIN — FAMOTIDINE 20 MILLIGRAM(S): 10 INJECTION INTRAVENOUS at 17:18

## 2021-11-08 RX ADMIN — ATORVASTATIN CALCIUM 40 MILLIGRAM(S): 80 TABLET, FILM COATED ORAL at 21:19

## 2021-11-08 RX ADMIN — OXYCODONE HYDROCHLORIDE 10 MILLIGRAM(S): 5 TABLET ORAL at 15:14

## 2021-11-08 RX ADMIN — OXYCODONE HYDROCHLORIDE 10 MILLIGRAM(S): 5 TABLET ORAL at 21:51

## 2021-11-08 RX ADMIN — Medication 0.6 MILLIGRAM(S): at 17:19

## 2021-11-08 RX ADMIN — ONDANSETRON 4 MILLIGRAM(S): 8 TABLET, FILM COATED ORAL at 14:43

## 2021-11-08 RX ADMIN — FAMOTIDINE 20 MILLIGRAM(S): 10 INJECTION INTRAVENOUS at 05:19

## 2021-11-08 RX ADMIN — ONDANSETRON 4 MILLIGRAM(S): 8 TABLET, FILM COATED ORAL at 09:25

## 2021-11-08 RX ADMIN — Medication 5 MILLIGRAM(S): at 17:18

## 2021-11-08 RX ADMIN — Medication 12.5 MILLIGRAM(S): at 11:43

## 2021-11-08 RX ADMIN — OXYCODONE HYDROCHLORIDE 10 MILLIGRAM(S): 5 TABLET ORAL at 04:00

## 2021-11-08 RX ADMIN — OXYCODONE HYDROCHLORIDE 10 MILLIGRAM(S): 5 TABLET ORAL at 09:40

## 2021-11-08 RX ADMIN — ENOXAPARIN SODIUM 40 MILLIGRAM(S): 100 INJECTION SUBCUTANEOUS at 11:41

## 2021-11-08 RX ADMIN — Medication 12.5 MILLIGRAM(S): at 22:09

## 2021-11-08 NOTE — PROGRESS NOTE ADULT - SUBJECTIVE AND OBJECTIVE BOX
OPERATIVE PROCEDURE(s):   CABGx5   + left radial          POD #    3                   61yMale  SURGEON(s): DR Goddard  SUBJECTIVE ASSESSMENT: pt seen and examined.   Vital Signs Last 24 Hrs  T(F): 97 (08 Nov 2021 04:00), Max: 98.9 (07 Nov 2021 20:00)  HR: 74 (08 Nov 2021 07:12) (71 - 85)  BP: 109/62 (08 Nov 2021 07:12) (96/69 - 152/78)  BP(mean): 80 (08 Nov 2021 07:12) (74 - 108)  RR: 11 (08 Nov 2021 07:12) (11 - 46)  SpO2: 93% (08 Nov 2021 07:12) (92% - 100%)    I&O's Detail    07 Nov 2021 07:01  -  08 Nov 2021 07:00  --------------------------------------------------------  IN:    Oral Fluid: 712 mL  Total IN: 712 mL    OUT:    Indwelling Catheter - Urethral (mL): 800 mL    Voided (mL): 650 mL  Total OUT: 1450 mL        Net:   I&O's Detail    06 Nov 2021 08:01  -  07 Nov 2021 07:00  --------------------------------------------------------  Total NET: -319 mL      07 Nov 2021 07:01  -  08 Nov 2021 07:00  --------------------------------------------------------  Total NET: -738 mL        CAPILLARY BLOOD GLUCOSE  187 (07 Nov 2021 07:00)      POCT Blood Glucose.: 148 mg/dL (08 Nov 2021 06:34)  POCT Blood Glucose.: 169 mg/dL (07 Nov 2021 23:12)  POCT Blood Glucose.: 166 mg/dL (07 Nov 2021 16:22)  POCT Blood Glucose.: 200 mg/dL (07 Nov 2021 11:22)    Physical Exam:  General: NAD; A&Ox3/Patient is intubated and sedated  Cardiac: S1/S2, RRR, no murmur, no rubs  Lungs: unlabored respirations, CTA b/l, no wheeze, no rales, no crackles  Abdomen: Soft/NT/ND; positive bowel sounds x 4  Sternum: Intact, no click, incision healing well with no drainage  Incisions: Incisions clean/dry/intact  Extremities: No edema b/l lower extremities; good capillary refill; no cyanosis; palpable 1+ pedal pulses b/l    Central Venous Catheter: Yes[]  No[] , If Yes indication:           Day #  Zamora Catheter: Yes  [] , No  [] , If yes indication:                      Day #  NGT: Yes [] No [] ,    If Yes Placement:                                     Day #  EPICARDIAL WIRES:  [] YES [] NO                                              Day #  BOWEL MOVEMENT:  [] YES [] NO, If No, Timing since last BM:      Day #  CHEST TUBE(Left/Right):  [] YES [] NO, If yes -  AIR LEAKS:  [] YES [] NO        LABS:                        9.1<L>  13.91<H> )-----------( 139      ( 08 Nov 2021 01:45 )             27.2<L>                        9.4<L>  13.26<H> )-----------( 135      ( 07 Nov 2021 01:16 )             27.7<L>    11-08    139  |  101  |  28<H>  ----------------------------<  125<H>  4.4   |  24  |  0.7  11-07    139  |  103  |  25<H>  ----------------------------<  158<H>  4.3   |  23  |  0.8    Ca    8.2<L>      08 Nov 2021 01:45  Mg     2.5     11-08    TPro  6.0 [6.0 - 8.0]  /  Alb  4.1 [3.5 - 5.2]  /  TBili  0.8 [0.2 - 1.2]  /  DBili  x   /  AST  27 [0 - 41]  /  ALT  27 [0 - 41]  /  AlkPhos  59 [30 - 115]  11-08        ABG - ( 06 Nov 2021 09:45 )  pH: 7.40  /  pCO2: 33    /  pO2: 96    / HCO3: 20    / Base Excess: -3.8  /  SaO2: 98.4  /  LA: 1.40             RADIOLOGY & ADDITIONAL TESTS:  CXR:  EKG:  MEDICATIONS  (STANDING):  albuterol/ipratropium for Nebulization 3 milliLiter(s) Nebulizer every 6 hours  aspirin enteric coated 325 milliGRAM(s) Oral daily  atorvastatin 40 milliGRAM(s) Oral at bedtime  bisacodyl 5 milliGRAM(s) Oral every 12 hours  chlorhexidine 4% Liquid 1 Application(s) Topical <User Schedule>  colchicine 0.6 milliGRAM(s) Oral every 12 hours  dextrose 40% Gel 15 Gram(s) Oral once  dextrose 5%. 1000 milliLiter(s) (50 mL/Hr) IV Continuous <Continuous>  dextrose 5%. 1000 milliLiter(s) (100 mL/Hr) IV Continuous <Continuous>  dextrose 50% Injectable 50 milliLiter(s) IV Push every 15 minutes  dextrose 50% Injectable 25 milliLiter(s) IV Push every 15 minutes  enoxaparin Injectable 40 milliGRAM(s) SubCutaneous daily  famotidine    Tablet 20 milliGRAM(s) Oral two times a day  glucagon  Injectable 1 milliGRAM(s) IntraMuscular once  guaiFENesin  milliGRAM(s) Oral every 12 hours  insulin lispro (ADMELOG) corrective regimen sliding scale   SubCutaneous three times a day before meals  magnesium sulfate  IVPB 1 Gram(s) IV Intermittent every 12 hours  metFORMIN 500 milliGRAM(s) Oral two times a day  metoprolol tartrate 12.5 milliGRAM(s) Oral every 12 hours  polyethylene glycol 3350 17 Gram(s) Oral daily  senna 2 Tablet(s) Oral at bedtime  sodium chloride 0.9%. 1000 milliLiter(s) (20 mL/Hr) IV Continuous <Continuous>    MEDICATIONS  (PRN):  acetaminophen     Tablet .. 650 milliGRAM(s) Oral every 6 hours PRN Temp greater or equal to 38C (100.4F), Mild Pain (1 - 3)  oxyCODONE    IR 10 milliGRAM(s) Oral every 4 hours PRN Severe Pain (7 - 10)  oxyCODONE    IR 5 milliGRAM(s) Oral every 4 hours PRN Moderate Pain (4 - 6)    HEPARIN:  [] YES [] NO  Dose: XX UNITS/HR UNITS Q8H  LOVENOX:[] YES [] NO  Dose: XX mg Q24H  COUMADIN: []  YES [] NO  Dose: XX mg  Q24H  SCD's: YES b/l  GI Prophylaxis: Protonix [], Pepcid [], None [], (Contra-indication:.....)    Post-Op Beta-Blockers: Yes [], No[], If No, then contraindication:  Post-Op Aspirin: Yes [],  No [], If No, then contraindication:  Post-Op Statin: Yes [], No[], If No, then contraindication:  Allergies    No Known Allergies    Intolerances      Ambulation/Activity Status:    Assessment/Plan:  61y Male status-post .....  - Case and plan discussed with CTU Intensivist and CT Surgeon - Dr. Gannon/Nitza/Bakari  - Continue CTU supportive care    - Continue DVT/GI prophylaxis  - Incentive Spirometry 10 times an hour  - Continue to advance physical activity as tolerated and continue PT/OT as directed  1. CAD: Continue ASA, statin, BB  2. HTN:   3. A. Fib:   4. COPD/Hypoxia:   5. DM/Glucose Control:     Social Service Disposition:     OPERATIVE PROCEDURE(s):   CABGx5   + left radial          POD #    3                   61yMale  SURGEON(s): DR Goddard  SUBJECTIVE ASSESSMENT: pt seen and examined. no acute complaints   Vital Signs Last 24 Hrs  T(F): 97 (08 Nov 2021 04:00), Max: 98.9 (07 Nov 2021 20:00)  HR: 74 (08 Nov 2021 07:12) (71 - 85)  BP: 109/62 (08 Nov 2021 07:12) (96/69 - 152/78)  BP(mean): 80 (08 Nov 2021 07:12) (74 - 108)  RR: 11 (08 Nov 2021 07:12) (11 - 46)  SpO2: 93% (08 Nov 2021 07:12) (92% - 100%)    I&O's Detail    07 Nov 2021 07:01  -  08 Nov 2021 07:00  --------------------------------------------------------  IN:    Oral Fluid: 712 mL  Total IN: 712 mL    OUT:    Indwelling Catheter - Urethral (mL): 800 mL    Voided (mL): 650 mL  Total OUT: 1450 mL        Net:   I&O's Detail    06 Nov 2021 08:01  -  07 Nov 2021 07:00  --------------------------------------------------------  Total NET: -319 mL      07 Nov 2021 07:01  -  08 Nov 2021 07:00  --------------------------------------------------------  Total NET: -738 mL        CAPILLARY BLOOD GLUCOSE  187 (07 Nov 2021 07:00)      POCT Blood Glucose.: 148 mg/dL (08 Nov 2021 06:34)  POCT Blood Glucose.: 169 mg/dL (07 Nov 2021 23:12)  POCT Blood Glucose.: 166 mg/dL (07 Nov 2021 16:22)  POCT Blood Glucose.: 200 mg/dL (07 Nov 2021 11:22)    Physical Exam:  General: NAD; A&Ox3  Cardiac: S1/S2, RRR, no murmur, no rubs  Lungs: unlabored respirations, CTA b/l, no wheeze, no rales, no crackles  Abdomen: Soft/NT/ND; positive bowel sounds x 4  Sternum: Intact, no click, incision healing well with no drainage  Incisions: Incisions clean/dry/intact  Extremities: No edema b/l lower extremities; good capillary refill; no cyanosis; palpable 1+ pedal pulses b/l    Central Venous Catheter: Yes[x]  No[] , If Yes indication: hd unstable           Day #3  Zamora Catheter: Yes  [] , No  [x] , If yes indication:                      Day #  NGT: Yes [] No [x] ,    If Yes Placement:                                     Day #  EPICARDIAL WIRES:  [x] YES [] NO                                              Day #3  BOWEL MOVEMENT:  [] YES [x] NO, If No, Timing since last BM:      Day #  CHEST TUBE(Left/Right):  [] YES [x] NO, If yes -  AIR LEAKS:  [] YES [] NO        LABS:                        9.1<L>  13.91<H> )-----------( 139      ( 08 Nov 2021 01:45 )             27.2<L>                        9.4<L>  13.26<H> )-----------( 135      ( 07 Nov 2021 01:16 )             27.7<L>    11-08    139  |  101  |  28<H>  ----------------------------<  125<H>  4.4   |  24  |  0.7  11-07    139  |  103  |  25<H>  ----------------------------<  158<H>  4.3   |  23  |  0.8    Ca    8.2<L>      08 Nov 2021 01:45  Mg     2.5     11-08    TPro  6.0 [6.0 - 8.0]  /  Alb  4.1 [3.5 - 5.2]  /  TBili  0.8 [0.2 - 1.2]  /  DBili  x   /  AST  27 [0 - 41]  /  ALT  27 [0 - 41]  /  AlkPhos  59 [30 - 115]  11-08        ABG - ( 06 Nov 2021 09:45 )  pH: 7.40  /  pCO2: 33    /  pO2: 96    / HCO3: 20    / Base Excess: -3.8  /  SaO2: 98.4  /  LA: 1.40             RADIOLOGY & ADDITIONAL TESTS:  CXR: < from: Xray Chest 1 View- PORTABLE-Routine (11.07.21 @ 06:40) >  Impression:  Low lung volume. Stable bibasilar predominant opacities. No evidence of pneumothorax.    < end of copied text >    EKG:  MEDICATIONS  (STANDING):  albuterol/ipratropium for Nebulization 3 milliLiter(s) Nebulizer every 6 hours  aspirin enteric coated 325 milliGRAM(s) Oral daily  atorvastatin 40 milliGRAM(s) Oral at bedtime  bisacodyl 5 milliGRAM(s) Oral every 12 hours  chlorhexidine 4% Liquid 1 Application(s) Topical <User Schedule>  colchicine 0.6 milliGRAM(s) Oral every 12 hours  dextrose 40% Gel 15 Gram(s) Oral once  dextrose 5%. 1000 milliLiter(s) (50 mL/Hr) IV Continuous <Continuous>  dextrose 5%. 1000 milliLiter(s) (100 mL/Hr) IV Continuous <Continuous>  dextrose 50% Injectable 50 milliLiter(s) IV Push every 15 minutes  dextrose 50% Injectable 25 milliLiter(s) IV Push every 15 minutes  enoxaparin Injectable 40 milliGRAM(s) SubCutaneous daily  famotidine    Tablet 20 milliGRAM(s) Oral two times a day  glucagon  Injectable 1 milliGRAM(s) IntraMuscular once  guaiFENesin  milliGRAM(s) Oral every 12 hours  insulin lispro (ADMELOG) corrective regimen sliding scale   SubCutaneous three times a day before meals  magnesium sulfate  IVPB 1 Gram(s) IV Intermittent every 12 hours  metFORMIN 500 milliGRAM(s) Oral two times a day  metoprolol tartrate 12.5 milliGRAM(s) Oral every 12 hours  polyethylene glycol 3350 17 Gram(s) Oral daily  senna 2 Tablet(s) Oral at bedtime  sodium chloride 0.9%. 1000 milliLiter(s) (20 mL/Hr) IV Continuous <Continuous>    MEDICATIONS  (PRN):  acetaminophen     Tablet .. 650 milliGRAM(s) Oral every 6 hours PRN Temp greater or equal to 38C (100.4F), Mild Pain (1 - 3)  oxyCODONE    IR 10 milliGRAM(s) Oral every 4 hours PRN Severe Pain (7 - 10)  oxyCODONE    IR 5 milliGRAM(s) Oral every 4 hours PRN Moderate Pain (4 - 6)      LOVENOX:[x] YES [] NO  Dose: 40 mg Q24H    SCD's: YES b/l  GI Prophylaxis: Protonix [], Pepcid [x], None [], (Contra-indication:.....)    Post-Op Beta-Blockers: Yes [], No[], If No, then contraindication:  Post-Op Aspirin: Yes [],  No [], If No, then contraindication:  Post-Op Statin: Yes [], No[], If No, then contraindication:  Allergies    No Known Allergies    Intolerances      Ambulation/Activity Status:    Assessment/Plan:  61y Male status-post .....  - Case and plan discussed with CTU Intensivist and CT Surgeon - Dr. Gannon/Nitza/Bakari  - Continue CTU supportive care    - Continue DVT/GI prophylaxis  - Incentive Spirometry 10 times an hour  - Continue to advance physical activity as tolerated and continue PT/OT as directed  1. CAD: Continue ASA, statin, BB  2. HTN:   3. A. Fib:   4. COPD/Hypoxia:   5. DM/Glucose Control:     Social Service Disposition:     OPERATIVE PROCEDURE(s):   CABGx5   + left radial          POD #    3                   61yMale  SURGEON(s): DR Goddard  SUBJECTIVE ASSESSMENT: pt seen and examined. no acute complaints   Vital Signs Last 24 Hrs  T(F): 97 (08 Nov 2021 04:00), Max: 98.9 (07 Nov 2021 20:00)  HR: 74 (08 Nov 2021 07:12) (71 - 85)  BP: 109/62 (08 Nov 2021 07:12) (96/69 - 152/78)  BP(mean): 80 (08 Nov 2021 07:12) (74 - 108)  RR: 11 (08 Nov 2021 07:12) (11 - 46)  SpO2: 93% (08 Nov 2021 07:12) (92% - 100%)    I&O's Detail    07 Nov 2021 07:01  -  08 Nov 2021 07:00  --------------------------------------------------------  IN:    Oral Fluid: 712 mL  Total IN: 712 mL    OUT:    Indwelling Catheter - Urethral (mL): 800 mL    Voided (mL): 650 mL  Total OUT: 1450 mL        Net:   I&O's Detail    06 Nov 2021 08:01  -  07 Nov 2021 07:00  --------------------------------------------------------  Total NET: -319 mL      07 Nov 2021 07:01  -  08 Nov 2021 07:00  --------------------------------------------------------  Total NET: -738 mL        CAPILLARY BLOOD GLUCOSE  187 (07 Nov 2021 07:00)      POCT Blood Glucose.: 148 mg/dL (08 Nov 2021 06:34)  POCT Blood Glucose.: 169 mg/dL (07 Nov 2021 23:12)  POCT Blood Glucose.: 166 mg/dL (07 Nov 2021 16:22)  POCT Blood Glucose.: 200 mg/dL (07 Nov 2021 11:22)    Physical Exam:  General: NAD; A&Ox3  Cardiac: S1/S2, RRR, no murmur, no rubs  Lungs: unlabored respirations, CTA b/l, no wheeze, no rales, no crackles  Abdomen: Soft/NT/ND; positive bowel sounds x 4  Sternum: Intact, no click, incision healing well with no drainage  Incisions: Incisions clean/dry/intact  Extremities: No edema b/l lower extremities; good capillary refill; no cyanosis; palpable 1+ pedal pulses b/l    Central Venous Catheter: Yes[x]  No[] , If Yes indication: hd unstable           Day #3  Zamora Catheter: Yes  [] , No  [x] , If yes indication:                      Day #  NGT: Yes [] No [x] ,    If Yes Placement:                                     Day #  EPICARDIAL WIRES:  [x] YES [] NO                                              Day #3  BOWEL MOVEMENT:  [] YES [x] NO, If No, Timing since last BM:      Day #  CHEST TUBE(Left/Right):  [] YES [x] NO, If yes -  AIR LEAKS:  [] YES [] NO        LABS:                        9.1<L>  13.91<H> )-----------( 139      ( 08 Nov 2021 01:45 )             27.2<L>                        9.4<L>  13.26<H> )-----------( 135      ( 07 Nov 2021 01:16 )             27.7<L>    11-08    139  |  101  |  28<H>  ----------------------------<  125<H>  4.4   |  24  |  0.7  11-07    139  |  103  |  25<H>  ----------------------------<  158<H>  4.3   |  23  |  0.8    Ca    8.2<L>      08 Nov 2021 01:45  Mg     2.5     11-08    TPro  6.0 [6.0 - 8.0]  /  Alb  4.1 [3.5 - 5.2]  /  TBili  0.8 [0.2 - 1.2]  /  DBili  x   /  AST  27 [0 - 41]  /  ALT  27 [0 - 41]  /  AlkPhos  59 [30 - 115]  11-08        ABG - ( 06 Nov 2021 09:45 )  pH: 7.40  /  pCO2: 33    /  pO2: 96    / HCO3: 20    / Base Excess: -3.8  /  SaO2: 98.4  /  LA: 1.40             RADIOLOGY & ADDITIONAL TESTS:  CXR: < from: Xray Chest 1 View- PORTABLE-Routine (11.07.21 @ 06:40) >  Impression:  Low lung volume. Stable bibasilar predominant opacities. No evidence of pneumothorax.    < end of copied text >    EKG:  MEDICATIONS  (STANDING):  albuterol/ipratropium for Nebulization 3 milliLiter(s) Nebulizer every 6 hours  aspirin enteric coated 325 milliGRAM(s) Oral daily  atorvastatin 40 milliGRAM(s) Oral at bedtime  bisacodyl 5 milliGRAM(s) Oral every 12 hours  chlorhexidine 4% Liquid 1 Application(s) Topical <User Schedule>  colchicine 0.6 milliGRAM(s) Oral every 12 hours  dextrose 40% Gel 15 Gram(s) Oral once  dextrose 5%. 1000 milliLiter(s) (50 mL/Hr) IV Continuous <Continuous>  dextrose 5%. 1000 milliLiter(s) (100 mL/Hr) IV Continuous <Continuous>  dextrose 50% Injectable 50 milliLiter(s) IV Push every 15 minutes  dextrose 50% Injectable 25 milliLiter(s) IV Push every 15 minutes  enoxaparin Injectable 40 milliGRAM(s) SubCutaneous daily  famotidine    Tablet 20 milliGRAM(s) Oral two times a day  glucagon  Injectable 1 milliGRAM(s) IntraMuscular once  guaiFENesin  milliGRAM(s) Oral every 12 hours  insulin lispro (ADMELOG) corrective regimen sliding scale   SubCutaneous three times a day before meals  magnesium sulfate  IVPB 1 Gram(s) IV Intermittent every 12 hours  metFORMIN 500 milliGRAM(s) Oral two times a day  metoprolol tartrate 12.5 milliGRAM(s) Oral every 12 hours  polyethylene glycol 3350 17 Gram(s) Oral daily  senna 2 Tablet(s) Oral at bedtime  sodium chloride 0.9%. 1000 milliLiter(s) (20 mL/Hr) IV Continuous <Continuous>    MEDICATIONS  (PRN):  acetaminophen     Tablet .. 650 milliGRAM(s) Oral every 6 hours PRN Temp greater or equal to 38C (100.4F), Mild Pain (1 - 3)  oxyCODONE    IR 10 milliGRAM(s) Oral every 4 hours PRN Severe Pain (7 - 10)  oxyCODONE    IR 5 milliGRAM(s) Oral every 4 hours PRN Moderate Pain (4 - 6)      LOVENOX:[x] YES [] NO  Dose: 40 mg Q24H    SCD's: YES b/l  GI Prophylaxis: Protonix [], Pepcid [x], None [], (Contra-indication:.....)    Post-Op Beta-Blockers: Yes [x], No[], If No, then contraindication:  Post-Op Aspirin: Yes [x],  No [], If No, then contraindication:  Post-Op Statin: Yes [x], No[], If No, then contraindication:  Allergies    No Known Allergies    Intolerances      Ambulation/Activity Status: ambulate     Assessment/Plan:  61y Male status-post CABG with radial and [place]    POD#3  - Case and plan discussed with CTU Intensivist and CT Surgeon - Dr. Villegas  - Continue CTU supportive care    - Continue DVT prophylaxis  - Continue GI prophylaxis  - Incentive Spirometry 10 times an hour  - Continue to advance physical activity as tolerated and continue PT/OT as directed  - CAD: continue  ASA, start statin, BB  - Anemia secondary to:  Acute PO blood loss anemia,     stable         track and trend H/H  - A. Fib:  prophylaxis with magnesium, lopressor   - COPD/Hypoxia: mild Po respiratory insufficiency - on room air, cont neb, mucinex, encourage incentive spirometry  - DM/Glucose Control: cont RISS, metformin  - respiratory acidosis received sodium bicarb- resolved   - Elevated ST segements on EKG, consistent with pericarditis - cont colchicine.   - cont Amlodipine 2.5 mg for radial artery harvest if blood pressure can tolerate, on low side today with initiation of BB      Social Service Disposition:  home   OPERATIVE PROCEDURE(s):   CABGx5   + left radial          POD #    3                   61yMale  SURGEON(s): DR Goddard  SUBJECTIVE ASSESSMENT: pt seen and examined. no acute complaints   Vital Signs Last 24 Hrs  T(F): 97 (08 Nov 2021 04:00), Max: 98.9 (07 Nov 2021 20:00)  HR: 74 (08 Nov 2021 07:12) (71 - 85)  BP: 109/62 (08 Nov 2021 07:12) (96/69 - 152/78)  BP(mean): 80 (08 Nov 2021 07:12) (74 - 108)  RR: 11 (08 Nov 2021 07:12) (11 - 46)  SpO2: 93% (08 Nov 2021 07:12) (92% - 100%)    I&O's Detail    07 Nov 2021 07:01  -  08 Nov 2021 07:00  --------------------------------------------------------  IN:    Oral Fluid: 712 mL  Total IN: 712 mL    OUT:    Indwelling Catheter - Urethral (mL): 800 mL    Voided (mL): 650 mL  Total OUT: 1450 mL        Net:   I&O's Detail    06 Nov 2021 08:01  -  07 Nov 2021 07:00  --------------------------------------------------------  Total NET: -319 mL      07 Nov 2021 07:01  -  08 Nov 2021 07:00  --------------------------------------------------------  Total NET: -738 mL        CAPILLARY BLOOD GLUCOSE  187 (07 Nov 2021 07:00)      POCT Blood Glucose.: 148 mg/dL (08 Nov 2021 06:34)  POCT Blood Glucose.: 169 mg/dL (07 Nov 2021 23:12)  POCT Blood Glucose.: 166 mg/dL (07 Nov 2021 16:22)  POCT Blood Glucose.: 200 mg/dL (07 Nov 2021 11:22)    Physical Exam:  General: NAD; A&Ox3  Cardiac: S1/S2, RRR, no murmur, no rubs  Lungs: unlabored respirations, CTA b/l, no wheeze, no rales, no crackles  Abdomen: Soft/NT/ND; positive bowel sounds x 4  Sternum: Intact, no click, incision healing well with no drainage  Incisions: Incisions clean/dry/intact  Extremities: No edema b/l lower extremities; good capillary refill; no cyanosis; palpable 1+ pedal pulses b/l    Central Venous Catheter: Yes[x]  No[] , If Yes indication: hd unstable           Day #3  Zamora Catheter: Yes  [] , No  [x] , If yes indication:                      Day #  NGT: Yes [] No [x] ,    If Yes Placement:                                     Day #  EPICARDIAL WIRES:  [x] YES [] NO                                              Day #3  BOWEL MOVEMENT:  [] YES [x] NO, If No, Timing since last BM:      Day #  CHEST TUBE(Left/Right):  [] YES [x] NO, If yes -  AIR LEAKS:  [] YES [] NO        LABS:                        9.1<L>  13.91<H> )-----------( 139      ( 08 Nov 2021 01:45 )             27.2<L>                        9.4<L>  13.26<H> )-----------( 135      ( 07 Nov 2021 01:16 )             27.7<L>    11-08    139  |  101  |  28<H>  ----------------------------<  125<H>  4.4   |  24  |  0.7  11-07    139  |  103  |  25<H>  ----------------------------<  158<H>  4.3   |  23  |  0.8    Ca    8.2<L>      08 Nov 2021 01:45  Mg     2.5     11-08    TPro  6.0 [6.0 - 8.0]  /  Alb  4.1 [3.5 - 5.2]  /  TBili  0.8 [0.2 - 1.2]  /  DBili  x   /  AST  27 [0 - 41]  /  ALT  27 [0 - 41]  /  AlkPhos  59 [30 - 115]  11-08        ABG - ( 06 Nov 2021 09:45 )  pH: 7.40  /  pCO2: 33    /  pO2: 96    / HCO3: 20    / Base Excess: -3.8  /  SaO2: 98.4  /  LA: 1.40             RADIOLOGY & ADDITIONAL TESTS:  CXR: < from: Xray Chest 1 View- PORTABLE-Routine (11.07.21 @ 06:40) >  Impression:  Low lung volume. Stable bibasilar predominant opacities. No evidence of pneumothorax.    < end of copied text >    EKG:  MEDICATIONS  (STANDING):  albuterol/ipratropium for Nebulization 3 milliLiter(s) Nebulizer every 6 hours  aspirin enteric coated 325 milliGRAM(s) Oral daily  atorvastatin 40 milliGRAM(s) Oral at bedtime  bisacodyl 5 milliGRAM(s) Oral every 12 hours  chlorhexidine 4% Liquid 1 Application(s) Topical <User Schedule>  colchicine 0.6 milliGRAM(s) Oral every 12 hours  dextrose 40% Gel 15 Gram(s) Oral once  dextrose 5%. 1000 milliLiter(s) (50 mL/Hr) IV Continuous <Continuous>  dextrose 5%. 1000 milliLiter(s) (100 mL/Hr) IV Continuous <Continuous>  dextrose 50% Injectable 50 milliLiter(s) IV Push every 15 minutes  dextrose 50% Injectable 25 milliLiter(s) IV Push every 15 minutes  enoxaparin Injectable 40 milliGRAM(s) SubCutaneous daily  famotidine    Tablet 20 milliGRAM(s) Oral two times a day  glucagon  Injectable 1 milliGRAM(s) IntraMuscular once  guaiFENesin  milliGRAM(s) Oral every 12 hours  insulin lispro (ADMELOG) corrective regimen sliding scale   SubCutaneous three times a day before meals  magnesium sulfate  IVPB 1 Gram(s) IV Intermittent every 12 hours  metFORMIN 500 milliGRAM(s) Oral two times a day  metoprolol tartrate 12.5 milliGRAM(s) Oral every 12 hours  polyethylene glycol 3350 17 Gram(s) Oral daily  senna 2 Tablet(s) Oral at bedtime  sodium chloride 0.9%. 1000 milliLiter(s) (20 mL/Hr) IV Continuous <Continuous>    MEDICATIONS  (PRN):  acetaminophen     Tablet .. 650 milliGRAM(s) Oral every 6 hours PRN Temp greater or equal to 38C (100.4F), Mild Pain (1 - 3)  oxyCODONE    IR 10 milliGRAM(s) Oral every 4 hours PRN Severe Pain (7 - 10)  oxyCODONE    IR 5 milliGRAM(s) Oral every 4 hours PRN Moderate Pain (4 - 6)      LOVENOX:[x] YES [] NO  Dose: 40 mg Q24H    SCD's: YES b/l  GI Prophylaxis: Protonix [], Pepcid [x], None [], (Contra-indication:.....)    Post-Op Beta-Blockers: Yes [x], No[], If No, then contraindication:  Post-Op Aspirin: Yes [x],  No [], If No, then contraindication:  Post-Op Statin: Yes [x], No[], If No, then contraindication:  Allergies    No Known Allergies    Intolerances      Ambulation/Activity Status: ambulate     Assessment/Plan:  61y Male status-post CABG with radial and [place]    POD#3  - Case and plan discussed with CTU Intensivist and CT Surgeon - Dr. Villegas  - Continue CTU supportive care    - Continue DVT prophylaxis  - Continue GI prophylaxis  - Incentive Spirometry 10 times an hour  - Continue to advance physical activity as tolerated and continue PT/OT as directed  - CAD: continue  ASA, start statin, BB  - Anemia secondary to:  Acute PO blood loss anemia,     stable         track and trend H/H  - A. Fib:  prophylaxis with magnesium, lopressor   - COPD/Hypoxia: mild Po respiratory insufficiency - on room air, cont neb, mucinex, encourage incentive spirometry  - DM/Glucose Control: cont RISS, metformin  - respiratory acidosis received sodium bicarb- resolved   - Elevated ST segements on EKG, consistent with pericarditis - cont colchicine.   - cont Amlodipine 2.5 mg for radial artery harvest if blood pressure can tolerate, on low side today with initiation of BB    - right pleural effusion- s/p right thoracentesis which drained 900cc serosanginous    Social Service Disposition:  home

## 2021-11-08 NOTE — PROGRESS NOTE ADULT - SUBJECTIVE AND OBJECTIVE BOX
CTU Attending Progress Daily Note     08 Nov 2021 10:47  POD# - 3  He has history of DM (diabetes mellitus)    HLD (hyperlipidemia)      Interval event for past 24 hr:  JOVANNY BENITO  61y had no event.   Current Complains:  JOVANNY BENITO has no new complains  HPI:  Patient is a 61y Male PMH: DM, HLD, + FH MI. Pt reports intermittent CP and SOB with activity over past few months , had positive stress echo reveals hypokinesis apical anterior wall, apical septal wall, mid anterior wall, mid inferoseptal wall, basal anterior wall and basal inferoseptal wall, [facility] recommended        Vital Signs Last 24 Hrs  T(C): --  T(F): --  HR: --72  BP: --169/88  BP(mean): --  RR: --  SpO2: --100% RA    Pre cath note:  indication:  [ ] STEMI                [ ] NSTEMI                 [ ] Acute coronary syndrome                   [ ]Unstable Angina   [ ] high risk  [ ] intermediate risk  [ ] low risk                   [x ] Stable Angina     non-invasive testing:           stress echo               Date:           10/25/21          result: [ x] high risk  [ ] intermediate risk  [ ] low risk  positive stress echo reveals hypokinesis apical anterior wall, apical septal wall, mid anterior wall, mid inferoseptal wall, basal anterior wall and basal inferoseptal wall    Anti- Anginal medications:                    [x ] not used                       [ ] used                   [ ] not used but strong indication not to use    Ejection Fraction                   [ ] <29            [ ] 30-39%   [ ] 40-49%     [ ]>50%    CHF                   [ ] active (within last 14 days on meds   [ ] Chronic (on meds but no exacerbation)    COPD                   [ ] mild (on chronic bronchodilators)  [ ] moderate (on chronic steroid therapy)      [ ] severe (indication for home O2 or PACO2 >50)    Other risk factors:                     [ ] Previous MI                     [ ] CVA/ stroke                    [ ] carotid stent/ CEA                    [ ] PVD/PAD- (arterial aneurysm, non-palpable pulses, tortuous vessel with inability to insert catheter, infra-renal dissection, renal or subclavian artery stenosis)                    [x ] diabetic                    [ ] previous CABG                    [ ] Renal Failure     Bleeding Risk:1.2%    REVIEW OF SYSTEMS:  CONSTITUTIONAL: No fever, weight loss, or fatigue  CARDIOLOGY: PAtient denies chest pain, shortness of breath or syncopal episodes.   RESPIRATORY: denies shortness of breath, wheezing  NEUROLOGICAL: NO weakness, no focal deficits to report.  ENDOCRINOLOGICAL: no recent change in diabetic medications.   GI: no BRBPR, no N,V,diarrhea.     PHYSICAL EXAM:  · CONSTITUTIONAL:	Well-developed, well nourished    ·RESPIRATORY:   airway patent; breath sounds equal; good air movement; respirations non-labored; clear to auscultation bilaterally; no chest wall tenderness; no intercostal retractions; no rales,rhonchi or wheeze  · CARDIOVASCULAR	regular rate and rhythm  no rub  no murmur  normal PMI  · EXTREMITIES: No cyanosis, clubbing or edema  · VASCULAR: 	Equal and normal pulses (carotid, femoral, dorsalis pedis)  	        EKG: SR (04 Nov 2021 09:03)    OBJECTIVE:  ICU Vital Signs Last 24 Hrs  T(C): 36.7 (08 Nov 2021 08:21), Max: 37.2 (07 Nov 2021 20:00)  T(F): 98 (08 Nov 2021 08:21), Max: 98.9 (07 Nov 2021 20:00)  HR: 79 (08 Nov 2021 10:00) (71 - 85)  BP: 124/67 (08 Nov 2021 10:00) (96/69 - 130/68)  BP(mean): 89 (08 Nov 2021 10:00) (74 - 97)  ABP: --  ABP(mean): --  RR: 17 (08 Nov 2021 10:00) (11 - 46)  SpO2: 97% (08 Nov 2021 10:00) (92% - 100%)    I&O's Summary    07 Nov 2021 07:01  -  08 Nov 2021 07:00  --------------------------------------------------------  IN: 712 mL / OUT: 1450 mL / NET: -738 mL    08 Nov 2021 07:01  -  08 Nov 2021 10:47  --------------------------------------------------------  IN: 360 mL / OUT: 1950 mL / NET: -1590 mL      I&O's Detail    07 Nov 2021 07:01  -  08 Nov 2021 07:00  --------------------------------------------------------  IN:    Oral Fluid: 712 mL  Total IN: 712 mL    OUT:    Indwelling Catheter - Urethral (mL): 800 mL    Voided (mL): 650 mL  Total OUT: 1450 mL    Total NET: -738 mL      08 Nov 2021 07:01  -  08 Nov 2021 10:47  --------------------------------------------------------  IN:    Oral Fluid: 360 mL  Total IN: 360 mL    OUT:    Evacuated Tube System (mL): 900 mL    Voided (mL): 1050 mL  Total OUT: 1950 mL    Total NET: -1590 mL        Adult Advanced Hemodynamics Last 24 Hrs  CVP(mm Hg): --  CVP(cm H2O): --  CO: --  CI: --  PA: --  PA(mean): --  PCWP: --  SVR: --  SVRI: --  PVR: --  PVRI: --    CAPILLARY BLOOD GLUCOSE      POCT Blood Glucose.: 148 mg/dL (08 Nov 2021 06:34)  POCT Blood Glucose.: 169 mg/dL (07 Nov 2021 23:12)  POCT Blood Glucose.: 166 mg/dL (07 Nov 2021 16:22)  POCT Blood Glucose.: 200 mg/dL (07 Nov 2021 11:22)    LABS:                          9.1    13.91 )-----------( 139      ( 08 Nov 2021 01:45 )             27.2     11-08    139  |  101  |  28<H>  ----------------------------<  125<H>  4.4   |  24  |  0.7    Ca    8.2<L>      08 Nov 2021 01:45  Mg     2.5     11-08    TPro  6.0  /  Alb  4.1  /  TBili  0.8  /  DBili  x   /  AST  27  /  ALT  27  /  AlkPhos  59  11-08          Home Medications:  Aspir 81 oral delayed release tablet: 1 tab(s) orally once a day (04 Nov 2021 09:13)  atorvastatin 40 mg oral tablet: 1 tab(s) orally once a day (04 Nov 2021 09:13)  metFORMIN 500 mg oral tablet: orally 3 times a day (04 Nov 2021 09:13)  Multiple Vitamins oral capsule: 1 cap(s) orally once a day (04 Nov 2021 09:13)    HOSPITAL MEDICATIONS:  MEDICATIONS  (STANDING):  albuterol/ipratropium for Nebulization 3 milliLiter(s) Nebulizer every 6 hours  aspirin enteric coated 325 milliGRAM(s) Oral daily  atorvastatin 40 milliGRAM(s) Oral at bedtime  bisacodyl 5 milliGRAM(s) Oral every 12 hours  chlorhexidine 4% Liquid 1 Application(s) Topical <User Schedule>  colchicine 0.6 milliGRAM(s) Oral every 12 hours  dextrose 40% Gel 15 Gram(s) Oral once  dextrose 5%. 1000 milliLiter(s) (50 mL/Hr) IV Continuous <Continuous>  dextrose 5%. 1000 milliLiter(s) (100 mL/Hr) IV Continuous <Continuous>  dextrose 50% Injectable 50 milliLiter(s) IV Push every 15 minutes  dextrose 50% Injectable 25 milliLiter(s) IV Push every 15 minutes  enoxaparin Injectable 40 milliGRAM(s) SubCutaneous daily  famotidine    Tablet 20 milliGRAM(s) Oral two times a day  furosemide   Injectable 40 milliGRAM(s) IV Push daily  glucagon  Injectable 1 milliGRAM(s) IntraMuscular once  guaiFENesin  milliGRAM(s) Oral every 12 hours  insulin lispro (ADMELOG) corrective regimen sliding scale   SubCutaneous three times a day before meals  magnesium sulfate  IVPB 1 Gram(s) IV Intermittent every 12 hours  metFORMIN 500 milliGRAM(s) Oral two times a day  metoprolol tartrate 12.5 milliGRAM(s) Oral every 12 hours  polyethylene glycol 3350 17 Gram(s) Oral daily  potassium chloride    Tablet ER 20 milliEquivalent(s) Oral daily  senna 2 Tablet(s) Oral at bedtime  sodium chloride 0.9%. 1000 milliLiter(s) (20 mL/Hr) IV Continuous <Continuous>    MEDICATIONS  (PRN):  acetaminophen     Tablet .. 650 milliGRAM(s) Oral every 6 hours PRN Temp greater or equal to 38C (100.4F), Mild Pain (1 - 3)  oxyCODONE    IR 10 milliGRAM(s) Oral every 4 hours PRN Severe Pain (7 - 10)  oxyCODONE    IR 5 milliGRAM(s) Oral every 4 hours PRN Moderate Pain (4 - 6)      REVIEW OF SYSTEMS:  CONSTITUTIONAL: [X] all negative; [ ] weakness, [ ] fevers, [ ] chills  EYES/ENT: [X] all negative; [ ] visual changes, [ ] vertigo, [ ] throat pain   NECK: [X] all negative; [ ] pain, [ ] stiffness  RESPIRATORY: [] all negative, [ ] cough, [ ] wheezing, [ ] hemoptysis, [ ] shortness of breath  CARDIOVASCULAR: [] all negative; [ ] chest pain, [ ] palpitations, [ ] orthopnea  GASTROINTESTINAL: [X] all negative; [ ]abdominal pain, [ ] nausea, [ ] vomiting, [ ] hematemesis, [ ] diarrhea, [ ] constipation, [ ] melena, [ ] hematochezia.  GENITOURINARY: [X] all negative; [ ] dysuria, [ ] frequency, [ ] hematuria  NEUROLOGICAL: [X] all negative; [ ] numbness, [ ] weakness  SKIN: [X] all negative; [ ] itching, [ ] burning, [ ] rashes, [ ] lesions   All other review of systems is negative unless indicated above.    [  ] Unable to assess ROS because     PHYSICAL EXAM:          CONSTITUTIONAL: Well-developed; well-nourished; in no acute distress.   	SKIN: warm, dry  	HEAD: Normocephalic; atraumatic.  	EYES: PERRL, EOM, no conj injection, sclera clear  	ENT: No nasal discharge; airway clear.  	NECK: Supple; non tender.  No midline ttp ctls  	CARD: S1, S2 normal; no murmurs, gallops, or rubs. Regular rate and rhythm. 2+ RPs and DPs bilat, no carotid bruits, no pedal   edema, no calf pain b/l  	RESP: CTA  bilat good air movement No wheezes, rales or rhonchi.  	ABD: Soft, not tender, not distended, no CVA ttp no rebound or guarding, bowel sounds present  	EXT: Normal ROM.  No clubbing, cyanosis or edema.   	  	NEURO: Alert, awake, motor 5/5 R, 5/5 L        RADIOLOGY:  xray  < from: Xray Chest 1 View- PORTABLE-Routine (11.07.21 @ 06:40) >    Impression:  Low lung volume. Stable bibasilar predominant opacities. No evidence of pneumothorax.          < end of copied text >    I spent 35 minutes ; more than 50% of visit was spent counseling and/or examining patient, reviewing vitals, labs, medications, imaging and discussing with the team goals of care to prevent life-threatening in this patient who is at high risk for deterioration or death due to:

## 2021-11-08 NOTE — PROCEDURE NOTE - NSPROCDETAILS_GEN_ALL_CORE
800/location identified, draped/prepped, sterile technique used, needle inserted/introduced/Seldinger technique/catheter inserted over needle/connection to syringe

## 2021-11-08 NOTE — PROGRESS NOTE ADULT - TIME BILLING
I spent 35 minutes ; more than 50% of visit was spent counseling and/or examining patient, reviewing vitals, labs, medications, imaging and discussing with the team goals of care to prevent life-threatening in this patient who is at high risk for deterioration

## 2021-11-09 ENCOUNTER — TRANSCRIPTION ENCOUNTER (OUTPATIENT)
Age: 61
End: 2021-11-09

## 2021-11-09 VITALS
RESPIRATION RATE: 25 BRPM | DIASTOLIC BLOOD PRESSURE: 62 MMHG | HEART RATE: 75 BPM | SYSTOLIC BLOOD PRESSURE: 108 MMHG | OXYGEN SATURATION: 99 % | TEMPERATURE: 98 F

## 2021-11-09 PROBLEM — E11.9 TYPE 2 DIABETES MELLITUS WITHOUT COMPLICATIONS: Chronic | Status: ACTIVE | Noted: 2021-11-04

## 2021-11-09 PROBLEM — E78.5 HYPERLIPIDEMIA, UNSPECIFIED: Chronic | Status: ACTIVE | Noted: 2021-11-04

## 2021-11-09 LAB
ALBUMIN SERPL ELPH-MCNC: 3.9 G/DL — SIGNIFICANT CHANGE UP (ref 3.5–5.2)
ALP SERPL-CCNC: 71 U/L — SIGNIFICANT CHANGE UP (ref 30–115)
ALT FLD-CCNC: 33 U/L — SIGNIFICANT CHANGE UP (ref 0–41)
ANION GAP SERPL CALC-SCNC: 13 MMOL/L — SIGNIFICANT CHANGE UP (ref 7–14)
AST SERPL-CCNC: 28 U/L — SIGNIFICANT CHANGE UP (ref 0–41)
BASOPHILS # BLD AUTO: 0.04 K/UL — SIGNIFICANT CHANGE UP (ref 0–0.2)
BASOPHILS NFR BLD AUTO: 0.4 % — SIGNIFICANT CHANGE UP (ref 0–1)
BILIRUB SERPL-MCNC: 0.8 MG/DL — SIGNIFICANT CHANGE UP (ref 0.2–1.2)
BUN SERPL-MCNC: 29 MG/DL — HIGH (ref 10–20)
CALCIUM SERPL-MCNC: 8.2 MG/DL — LOW (ref 8.5–10.1)
CHLORIDE SERPL-SCNC: 99 MMOL/L — SIGNIFICANT CHANGE UP (ref 98–110)
CO2 SERPL-SCNC: 23 MMOL/L — SIGNIFICANT CHANGE UP (ref 17–32)
CREAT SERPL-MCNC: 0.8 MG/DL — SIGNIFICANT CHANGE UP (ref 0.7–1.5)
EOSINOPHIL # BLD AUTO: 0.15 K/UL — SIGNIFICANT CHANGE UP (ref 0–0.7)
EOSINOPHIL NFR BLD AUTO: 1.4 % — SIGNIFICANT CHANGE UP (ref 0–8)
GLUCOSE BLDC GLUCOMTR-MCNC: 133 MG/DL — HIGH (ref 70–99)
GLUCOSE BLDC GLUCOMTR-MCNC: 139 MG/DL — HIGH (ref 70–99)
GLUCOSE SERPL-MCNC: 109 MG/DL — HIGH (ref 70–99)
HCT VFR BLD CALC: 26.9 % — LOW (ref 42–52)
HGB BLD-MCNC: 9 G/DL — LOW (ref 14–18)
IMM GRANULOCYTES NFR BLD AUTO: 0.5 % — HIGH (ref 0.1–0.3)
LYMPHOCYTES # BLD AUTO: 2.8 K/UL — SIGNIFICANT CHANGE UP (ref 1.2–3.4)
LYMPHOCYTES # BLD AUTO: 25.4 % — SIGNIFICANT CHANGE UP (ref 20.5–51.1)
MAGNESIUM SERPL-MCNC: 2.6 MG/DL — HIGH (ref 1.8–2.4)
MCHC RBC-ENTMCNC: 30.1 PG — SIGNIFICANT CHANGE UP (ref 27–31)
MCHC RBC-ENTMCNC: 33.5 G/DL — SIGNIFICANT CHANGE UP (ref 32–37)
MCV RBC AUTO: 90 FL — SIGNIFICANT CHANGE UP (ref 80–94)
MONOCYTES # BLD AUTO: 1.12 K/UL — HIGH (ref 0.1–0.6)
MONOCYTES NFR BLD AUTO: 10.2 % — HIGH (ref 1.7–9.3)
NEUTROPHILS # BLD AUTO: 6.87 K/UL — HIGH (ref 1.4–6.5)
NEUTROPHILS NFR BLD AUTO: 62.1 % — SIGNIFICANT CHANGE UP (ref 42.2–75.2)
NRBC # BLD: 0 /100 WBCS — SIGNIFICANT CHANGE UP (ref 0–0)
PLATELET # BLD AUTO: 157 K/UL — SIGNIFICANT CHANGE UP (ref 130–400)
POTASSIUM SERPL-MCNC: 4.2 MMOL/L — SIGNIFICANT CHANGE UP (ref 3.5–5)
POTASSIUM SERPL-SCNC: 4.2 MMOL/L — SIGNIFICANT CHANGE UP (ref 3.5–5)
PROT SERPL-MCNC: 5.8 G/DL — LOW (ref 6–8)
RBC # BLD: 2.99 M/UL — LOW (ref 4.7–6.1)
RBC # FLD: 12.3 % — SIGNIFICANT CHANGE UP (ref 11.5–14.5)
SODIUM SERPL-SCNC: 135 MMOL/L — SIGNIFICANT CHANGE UP (ref 135–146)
WBC # BLD: 11.03 K/UL — HIGH (ref 4.8–10.8)
WBC # FLD AUTO: 11.03 K/UL — HIGH (ref 4.8–10.8)

## 2021-11-09 PROCEDURE — 71046 X-RAY EXAM CHEST 2 VIEWS: CPT | Mod: 26

## 2021-11-09 PROCEDURE — 99232 SBSQ HOSP IP/OBS MODERATE 35: CPT

## 2021-11-09 PROCEDURE — 93010 ELECTROCARDIOGRAM REPORT: CPT

## 2021-11-09 RX ORDER — POTASSIUM CHLORIDE 20 MEQ
1 PACKET (EA) ORAL
Qty: 5 | Refills: 0
Start: 2021-11-09 | End: 2021-11-13

## 2021-11-09 RX ORDER — POLYETHYLENE GLYCOL 3350 17 G/17G
17 POWDER, FOR SOLUTION ORAL
Qty: 510 | Refills: 0
Start: 2021-11-09 | End: 2021-12-08

## 2021-11-09 RX ORDER — METOPROLOL TARTRATE 50 MG
0.5 TABLET ORAL
Qty: 30 | Refills: 0
Start: 2021-11-09 | End: 2021-12-08

## 2021-11-09 RX ORDER — FAMOTIDINE 10 MG/ML
1 INJECTION INTRAVENOUS
Qty: 60 | Refills: 0
Start: 2021-11-09 | End: 2021-12-08

## 2021-11-09 RX ORDER — FUROSEMIDE 40 MG
1 TABLET ORAL
Qty: 5 | Refills: 0
Start: 2021-11-09 | End: 2021-11-13

## 2021-11-09 RX ORDER — ASPIRIN/CALCIUM CARB/MAGNESIUM 324 MG
1 TABLET ORAL
Qty: 0 | Refills: 0 | DISCHARGE

## 2021-11-09 RX ORDER — ASPIRIN/CALCIUM CARB/MAGNESIUM 324 MG
1 TABLET ORAL
Qty: 30 | Refills: 0
Start: 2021-11-09 | End: 2021-12-08

## 2021-11-09 RX ADMIN — POLYETHYLENE GLYCOL 3350 17 GRAM(S): 17 POWDER, FOR SOLUTION ORAL at 11:28

## 2021-11-09 RX ADMIN — OXYCODONE HYDROCHLORIDE 10 MILLIGRAM(S): 5 TABLET ORAL at 06:20

## 2021-11-09 RX ADMIN — Medication 325 MILLIGRAM(S): at 11:29

## 2021-11-09 RX ADMIN — Medication 650 MILLIGRAM(S): at 10:39

## 2021-11-09 RX ADMIN — Medication 12.5 MILLIGRAM(S): at 10:53

## 2021-11-09 RX ADMIN — METFORMIN HYDROCHLORIDE 500 MILLIGRAM(S): 850 TABLET ORAL at 06:11

## 2021-11-09 RX ADMIN — Medication 40 MILLIGRAM(S): at 06:09

## 2021-11-09 RX ADMIN — FAMOTIDINE 20 MILLIGRAM(S): 10 INJECTION INTRAVENOUS at 06:10

## 2021-11-09 RX ADMIN — CHLORHEXIDINE GLUCONATE 1 APPLICATION(S): 213 SOLUTION TOPICAL at 06:06

## 2021-11-09 RX ADMIN — Medication 20 MILLIEQUIVALENT(S): at 11:28

## 2021-11-09 RX ADMIN — Medication 600 MILLIGRAM(S): at 06:10

## 2021-11-09 RX ADMIN — Medication 100 GRAM(S): at 06:15

## 2021-11-09 RX ADMIN — Medication 0.6 MILLIGRAM(S): at 06:08

## 2021-11-09 RX ADMIN — Medication 650 MILLIGRAM(S): at 11:05

## 2021-11-09 NOTE — PROGRESS NOTE ADULT - ASSESSMENT
Assessment/Plan:  CAD-s/p CABG x 5-POD #2  1-BP control-metoprolol  2-serum glucose control-start Metformin with sliding scale correction regimen  3-fluid overload-diuresis  4-acute blood loss anemia-stable, monitor Hb/Hct daily  0-khfvqdyfdjoo-jxsyxpwf colchicine
Assessment/Plan:  CAD-s/p CABG x 5-POD #4    1-BP control-metoprolol  2-serum glucose control-start Metformin with sliding scale correction regimen  3-fluid overload-diuresis daily with K supplemt  4-acute blood loss anemia-stable, monitor Hb/Hct daily  3-tbbqhztugben-fmdbwjqd  d/c colchicine    d/c home today 
PROBLEMS:  I spent 45 minutes of time examining patient, reviewing vitals, labs, medications, imaging and discussing with the team goals of care to prevent life-threatening in this patient who is at high risk for deterioration or death due to:      CAD - s/p CABG, , Add lopressor 12.5 q12, Lipitor 40, Lovenox 40,   Hyperglycemia - sliding scale   Metabolic acidosis - got 3 amps of Bicarbonate - follow ABG  Labs at 2 pm  pericarditis - add colchicine 0.6 q 12  Anemia - monitor      Case and plan discussed with CT Surgeons and CTU PAs.  Continue CTU supportive care and ongoing plan of care as per continuing CTU rounds.   Continue DVT/GI prophylaxis.  Incentive Spirometry 10 times an hour.  Continue to advance physical activity as tolerated and continue PT/OT as directed.    PLAN  Neuro: move all 4 extremities. no sensory or motor deficits  Pain management.     Pulm: Wean off supplemental oxygen as able. Daily CXR. Encourage coughing, deep breathing and use of incentive spirometry.   ALBUTerol    90 MICROgram(s) HFA Inhaler 2 Puff(s) Inhalation every 6 hours  guaiFENesin  milliGRAM(s) Oral every 12 hours  ipratropium 17 MICROgram(s) HFA Inhaler 2 Puff(s) Inhalation every 6 hours    Cardio: Monitor telemetry/alarms. Continue supportive care   metoprolol tartrate 12.5 milliGRAM(s) Oral every 12 hours    GI: Continue stool softeners.    famotidine    Tablet 20 milliGRAM(s) Oral two times a day    Nutrition: Continue present diet  Endocrine and glucose control:   atorvastatin 40 milliGRAM(s) Oral at bedtime  colchicine 0.6 milliGRAM(s) Oral every 12 hours  vasopressin Infusion 0.04 Unit(s)/Min IV Continuous <Continuous>    Renal: monitor urine output, supplement electrolytes as needed,     Vasc: Heparin SC and/or SCDs for DVT prophylaxis    ID: Stable, no fever , no chills. Off antibiotics.    Tubes: Monitor Chest tube output  Therapy: OOB/ambulate  Disposition: start planing discharge home or placement    Pertinent clinical, laboratory, radiographic, hemodynamic, echocardiographic, respiratory data, microbiologic data and chart were reviewed and analyzed frequently throughout the course of the day and night. GI and DVT prophylaxis, glycemic control, head of bed elevation and skin care issues were addressed.  Patient seen, examined and plan discussed with CT Surgery / CTICU team during rounds.    [ ] The patient remains in critical and unstable condition, and requires ICU care and monitoring  [x ] The patient is improving but requires continued monitoring and adjustment of therapy
Assessment/Plan:  CAD-s/p CABG x 5-POD #3  R pleural effusion on bedside sono  this am R thoracenthesis done by me 900 l bloody tinged fluid removed  1-BP control-metoprolol  2-serum glucose control-start Metformin with sliding scale correction regimen  3-fluid overload-diuresis daily with K supplemt  4-acute blood loss anemia-stable, monitor Hb/Hct daily  3-wthsbtkguvkx-yvvbrtrn colchicine

## 2021-11-09 NOTE — DISCHARGE NOTE NURSING/CASE MANAGEMENT/SOCIAL WORK - PATIENT PORTAL LINK FT
You can access the FollowMyHealth Patient Portal offered by [facility] by registering at the following website: [email or web address]. By joining [facility]’s FollowMyHealth portal, you will also be able to view your health information using other applications (apps) compatible with our system.

## 2021-11-09 NOTE — PROGRESS NOTE ADULT - SUBJECTIVE AND OBJECTIVE BOX
OPERATIVE PROCEDURE(s):                POD #                       61yMale  SURGEON(s): JENNIFER Rene  SUBJECTIVE ASSESSMENT:   Vital Signs Last 24 Hrs  T(F): 98.9 (09 Nov 2021 04:20), Max: 98.9 (09 Nov 2021 04:20)  HR: 77 (09 Nov 2021 07:00) (72 - 90)  BP: 114/64 (09 Nov 2021 07:00) (92/53 - 135/78)  BP(mean): 84 (09 Nov 2021 07:00) (65 - 101)  ABP: --  ABP(mean): --  RR: 20 (09 Nov 2021 07:00) (11 - 29)  SpO2: 97% (09 Nov 2021 07:00) (90% - 100%)  CVP(mm Hg): --  CVP(cm H2O): --  CO: --  CI: --  PA: --  SVR: --    I&O's Detail    08 Nov 2021 07:01  -  09 Nov 2021 07:00  --------------------------------------------------------  IN:    IV PiggyBack: 100 mL    Oral Fluid: 1680 mL  Total IN: 1780 mL    OUT:    Evacuated Tube System (mL): 900 mL    Voided (mL): 3125 mL  Total OUT: 4025 mL        Net:   I&O's Detail    07 Nov 2021 07:01  -  08 Nov 2021 07:00  --------------------------------------------------------  Total NET: -738 mL      08 Nov 2021 07:01  -  09 Nov 2021 07:00  --------------------------------------------------------  Total NET: -2245 mL        CAPILLARY BLOOD GLUCOSE      POCT Blood Glucose.: 133 mg/dL (09 Nov 2021 06:52)  POCT Blood Glucose.: 148 mg/dL (08 Nov 2021 21:34)  POCT Blood Glucose.: 146 mg/dL (08 Nov 2021 16:05)  POCT Blood Glucose.: 144 mg/dL (08 Nov 2021 11:05)    Physical Exam:  General: NAD; A&Ox3/Patient is intubated and sedated  Cardiac: S1/S2, RRR, no murmur, no rubs  Lungs: unlabored respirations, CTA b/l, no wheeze, no rales, no crackles  Abdomen: Soft/NT/ND; positive bowel sounds x 4  Sternum: Intact, no click, incision healing well with no drainage  Incisions: Incisions clean/dry/intact  Extremities: No edema b/l lower extremities; good capillary refill; no cyanosis; palpable 1+ pedal pulses b/l    Central Venous Catheter: Yes[]  No[] , If Yes indication:           Day #  [place] Catheter: Yes  [] , No  [] , If yes indication:                      Day #  NGT: Yes [] No [] ,    If Yes Placement:                                     Day #  EPICARDIAL WIRES:  [] YES [] NO                                              Day #  BOWEL MOVEMENT:  [] YES [] NO, If No, Timing since last BM:      Day #  CHEST TUBE(Left/Right):  [] YES [] NO, If yes -  AIR LEAKS:  [] YES [] NO        LABS:                        9.0<L>  11.03<H> )-----------( 157      ( 09 Nov 2021 02:00 )             26.9<L>                        9.1<L>  13.91<H> )-----------( 139      ( 08 Nov 2021 01:45 )             27.2<L>    11-09    135  |  99  |  29<H>  ----------------------------<  109<H>  4.2   |  23  |  0.8  11-08    139  |  101  |  28<H>  ----------------------------<  125<H>  4.4   |  24  |  0.7    Ca    8.2<L>      09 Nov 2021 02:00  Mg     2.6     11-09    TPro  5.8<L> [6.0 - 8.0]  /  Alb  3.9 [3.5 - 5.2]  /  TBili  0.8 [0.2 - 1.2]  /  DBili  x   /  AST  28 [0 - 41]  /  ALT  33 [0 - 41]  /  AlkPhos  71 [30 - 115]  11-09          RADIOLOGY & ADDITIONAL TESTS:  CXR:  EKG:  MEDICATIONS  (STANDING):  albuterol/ipratropium for Nebulization 3 milliLiter(s) Nebulizer every 6 hours  aspirin enteric coated 325 milliGRAM(s) Oral daily  atorvastatin 40 milliGRAM(s) Oral at bedtime  chlorhexidine 4% Liquid 1 Application(s) Topical <User Schedule>  colchicine 0.6 milliGRAM(s) Oral every 12 hours  dextrose 40% Gel 15 Gram(s) Oral once  dextrose 5%. 1000 milliLiter(s) (50 mL/Hr) IV Continuous <Continuous>  dextrose 5%. 1000 milliLiter(s) (100 mL/Hr) IV Continuous <Continuous>  dextrose 50% Injectable 50 milliLiter(s) IV Push every 15 minutes  dextrose 50% Injectable 25 milliLiter(s) IV Push every 15 minutes  enoxaparin Injectable 40 milliGRAM(s) SubCutaneous daily  famotidine    Tablet 20 milliGRAM(s) Oral two times a day  furosemide   Injectable 40 milliGRAM(s) IV Push daily  glucagon  Injectable 1 milliGRAM(s) IntraMuscular once  guaiFENesin  milliGRAM(s) Oral every 12 hours  insulin lispro (ADMELOG) corrective regimen sliding scale   SubCutaneous three times a day before meals  magnesium sulfate  IVPB 1 Gram(s) IV Intermittent every 12 hours  metFORMIN 500 milliGRAM(s) Oral two times a day  metoprolol tartrate 12.5 milliGRAM(s) Oral every 12 hours  polyethylene glycol 3350 17 Gram(s) Oral daily  potassium chloride    Tablet ER 20 milliEquivalent(s) Oral daily  senna 2 Tablet(s) Oral at bedtime  sodium chloride 0.9%. 1000 milliLiter(s) (20 mL/Hr) IV Continuous <Continuous>    MEDICATIONS  (PRN):  acetaminophen     Tablet .. 650 milliGRAM(s) Oral every 6 hours PRN Temp greater or equal to 38C (100.4F), Mild Pain (1 - 3)  oxyCODONE    IR 10 milliGRAM(s) Oral every 4 hours PRN Severe Pain (7 - 10)  oxyCODONE    IR 5 milliGRAM(s) Oral every 4 hours PRN Moderate Pain (4 - 6)    HEPARIN:  [] YES [] NO  Dose: XX UNITS/HR UNITS Q8H  LOVENOX:[] YES [] NO  Dose: XX mg Q24H  COUMADIN: []  YES [] NO  Dose: XX mg  Q24H  SCD's: YES b/l  GI Prophylaxis: Protonix [], Pepcid [], None [], (Contra-indication:.....)    Post-Op Beta-Blockers: Yes [], No[], If No, then contraindication:  Post-Op Aspirin: Yes [],  No [], If No, then contraindication:  Post-Op Statin: Yes [], No[], If No, then contraindication:  Allergies    No Known Allergies    Intolerances      Ambulation/Activity Status:    Assessment/Plan:  61y Male status-post .....  - Case and plan discussed with CTU Intensivist and CT Surgeon - Dr. Gannon/Nitza/Bakari  - Continue CTU supportive care    - Continue DVT/GI prophylaxis  - Incentive Spirometry 10 times an hour  - Continue to advance physical activity as tolerated and continue PT/OT as directed  1. CAD: Continue ASA, statin, BB  2. HTN:   3. A. Fib:   4. COPD/Hypoxia:   5. DM/Glucose Control:     Social Service Disposition:     OPERATIVE PROCEDURE(s):  CABGx5   + left radial          POD #    4                         61yMale  SURGEON(s): Dr. Goddard  SUBJECTIVE ASSESSMENT: pt seen and examined. no acute complaints   Vital Signs Last 24 Hrs  T(F): 98.9 (09 Nov 2021 04:20), Max: 98.9 (09 Nov 2021 04:20)  HR: 77 (09 Nov 2021 07:00) (72 - 90)  BP: 114/64 (09 Nov 2021 07:00) (92/53 - 135/78)  BP(mean): 84 (09 Nov 2021 07:00) (65 - 101)  RR: 20 (09 Nov 2021 07:00) (11 - 29)  SpO2: 97% (09 Nov 2021 07:00) (90% - 100%)      I&O's Detail    08 Nov 2021 07:01  -  09 Nov 2021 07:00  --------------------------------------------------------  IN:    IV PiggyBack: 100 mL    Oral Fluid: 1680 mL  Total IN: 1780 mL    OUT:    Evacuated Tube System (mL): 900 mL    Voided (mL): 3125 mL  Total OUT: 4025 mL        Net:   I&O's Detail    07 Nov 2021 07:01  -  08 Nov 2021 07:00  --------------------------------------------------------  Total NET: -738 mL      08 Nov 2021 07:01  -  09 Nov 2021 07:00  --------------------------------------------------------  Total NET: -2245 mL        CAPILLARY BLOOD GLUCOSE      POCT Blood Glucose.: 133 mg/dL (09 Nov 2021 06:52)  POCT Blood Glucose.: 148 mg/dL (08 Nov 2021 21:34)  POCT Blood Glucose.: 146 mg/dL (08 Nov 2021 16:05)  POCT Blood Glucose.: 144 mg/dL (08 Nov 2021 11:05)    Physical Exam:  General: NAD; A&Ox3  Cardiac: S1/S2, RRR, no murmur, no rubs  Lungs: unlabored respirations, CTA b/l, no wheeze, no rales, no crackles  Abdomen: Soft/NT/ND; positive bowel sounds x 4  Sternum: Intact, no click, incision healing well with no drainage  Incisions: Incisions clean/dry/intact  Extremities: No edema b/l lower extremities; good capillary refill; no cyanosis; palpable 1+ pedal pulses b/l      EPICARDIAL WIRES:  [x] YES [] NO                                              Day #4  BOWEL MOVEMENT:  [x] YES [] NO, If No, Timing since last BM:      Day #          LABS:                        9.0<L>  11.03<H> )-----------( 157      ( 09 Nov 2021 02:00 )             26.9<L>                        9.1<L>  13.91<H> )-----------( 139      ( 08 Nov 2021 01:45 )             27.2<L>    11-09    135  |  99  |  29<H>  ----------------------------<  109<H>  4.2   |  23  |  0.8  11-08    139  |  101  |  28<H>  ----------------------------<  125<H>  4.4   |  24  |  0.7    Ca    8.2<L>      09 Nov 2021 02:00  Mg     2.6     11-09    TPro  5.8<L> [6.0 - 8.0]  /  Alb  3.9 [3.5 - 5.2]  /  TBili  0.8 [0.2 - 1.2]  /  DBili  x   /  AST  28 [0 - 41]  /  ALT  33 [0 - 41]  /  AlkPhos  71 [30 - 115]  11-09          RADIOLOGY & ADDITIONAL TESTS:  CXR: < from: Xray Chest 2 Views PA/Lat (11.09.21 @ 08:28) >  IMPRESSION:    Small bilateral pleural effusions.    < end of copied text >    EKG: < from: 12 Lead ECG (11.07.21 @ 08:25) >  Ventricular Rate 73 BPM    Atrial Rate 73 BPM    P-R Interval 168 ms    QRS Duration 88 ms    Q-T Interval 386 ms    QTC Calculation(Bazett) 425 ms    P Axis 14 degrees    R Axis -15 degrees    T Axis -13 degrees    Diagnosis Line Normal sinus rhythm  Inferior infarct , possibly acute  Anterolateral injury pattern    < end of copied text >    MEDICATIONS  (STANDING):  albuterol/ipratropium for Nebulization 3 milliLiter(s) Nebulizer every 6 hours  aspirin enteric coated 325 milliGRAM(s) Oral daily  atorvastatin 40 milliGRAM(s) Oral at bedtime  chlorhexidine 4% Liquid 1 Application(s) Topical <User Schedule>  colchicine 0.6 milliGRAM(s) Oral every 12 hours  dextrose 40% Gel 15 Gram(s) Oral once  dextrose 5%. 1000 milliLiter(s) (50 mL/Hr) IV Continuous <Continuous>  dextrose 5%. 1000 milliLiter(s) (100 mL/Hr) IV Continuous <Continuous>  dextrose 50% Injectable 50 milliLiter(s) IV Push every 15 minutes  dextrose 50% Injectable 25 milliLiter(s) IV Push every 15 minutes  enoxaparin Injectable 40 milliGRAM(s) SubCutaneous daily  famotidine    Tablet 20 milliGRAM(s) Oral two times a day  furosemide   Injectable 40 milliGRAM(s) IV Push daily  glucagon  Injectable 1 milliGRAM(s) IntraMuscular once  guaiFENesin  milliGRAM(s) Oral every 12 hours  insulin lispro (ADMELOG) corrective regimen sliding scale   SubCutaneous three times a day before meals  magnesium sulfate  IVPB 1 Gram(s) IV Intermittent every 12 hours  metFORMIN 500 milliGRAM(s) Oral two times a day  metoprolol tartrate 12.5 milliGRAM(s) Oral every 12 hours  polyethylene glycol 3350 17 Gram(s) Oral daily  potassium chloride    Tablet ER 20 milliEquivalent(s) Oral daily  senna 2 Tablet(s) Oral at bedtime  sodium chloride 0.9%. 1000 milliLiter(s) (20 mL/Hr) IV Continuous <Continuous>    MEDICATIONS  (PRN):  acetaminophen     Tablet .. 650 milliGRAM(s) Oral every 6 hours PRN Temp greater or equal to 38C (100.4F), Mild Pain (1 - 3)  oxyCODONE    IR 10 milliGRAM(s) Oral every 4 hours PRN Severe Pain (7 - 10)  oxyCODONE    IR 5 milliGRAM(s) Oral every 4 hours PRN Moderate Pain (4 - 6)      LOVENOX:[] YES [x] NO  Dose: XX mg Q24H    SCD's: YES b/l  GI Prophylaxis: Protonix [], Pepcid [x], None [], (Contra-indication:.....)    Post-Op Beta-Blockers: Yes [x], No[], If No, then contraindication:  Post-Op Aspirin: Yes [x],  No [], If No, then contraindication:  Post-Op Statin: Yes [x], No[], If No, then contraindication:  Allergies    No Known Allergies    Intolerances      Ambulation/Activity Status: ambulate     Assessment/Plan:  61y Male status-post CABG with radial and [place]    POD#4  - Case and plan discussed with CTU Intensivist and CT Surgeon - Dr. Villegas  - Continue CTU supportive care    - Continue DVT prophylaxis  - Continue GI prophylaxis  - Incentive Spirometry 10 times an hour  - Continue to advance physical activity as tolerated and continue PT/OT as directed  - CAD: continue  ASA, start statin, BB  - Anemia secondary to:  Acute PO blood loss anemia,     stable         track and trend H/H  - A. Fib:  prophylaxis with magnesium, lopressor   - COPD/Hypoxia: mild Po respiratory insufficiency - on room air, cont neb, mucinex, encourage incentive spirometry  - DM/Glucose Control: cont RISS, metformin  - respiratory acidosis received sodium bicarb- resolved   - Elevated ST segements on EKG, consistent with pericarditis - cont colchicine.   - cont Amlodipine 2.5 mg for radial artery harvest if blood pressure can tolerate, on low side today with initiation of BB    - right pleural effusion- s/p right thoracentesis which drained 900cc serosanginous    Social Service Disposition:  home

## 2021-11-09 NOTE — PROGRESS NOTE ADULT - TIME BILLING
I spent 25 minutes time ; more than 50% of visit was spent counseling and/or examining patient, reviewing vitals, labs, medications, imaging and discussing with the team goals of care to prevent life-threatening in this patient who is at high risk for deterioration

## 2021-11-09 NOTE — PROGRESS NOTE ADULT - SUBJECTIVE AND OBJECTIVE BOX
CTU Attending Progress Daily Note     09 Nov 2021 10:44  POD# - 4   He has history of DM (diabetes mellitus)    HLD (hyperlipidemia)      Interval event for past 24 hr:  JOVANNY BENITO  61y had no event.   Current Complains:  JOVANNY BENITO has no new complains  HPI:  Patient is a 61y Male PMH: DM, HLD, + FH MI. Pt reports intermittent CP and SOB with activity over past few months , had positive stress echo reveals hypokinesis apical anterior wall, apical septal wall, mid anterior wall, mid inferoseptal wall, basal anterior wall and basal inferoseptal wall, [facility] recommended        Vital Signs Last 24 Hrs  T(C): --  T(F): --  HR: --72  BP: --169/88  BP(mean): --  RR: --  SpO2: --100% RA    Pre cath note:  indication:  [ ] STEMI                [ ] NSTEMI                 [ ] Acute coronary syndrome                   [ ]Unstable Angina   [ ] high risk  [ ] intermediate risk  [ ] low risk                   [x ] Stable Angina     non-invasive testing:           stress echo               Date:           10/25/21          result: [ x] high risk  [ ] intermediate risk  [ ] low risk  positive stress echo reveals hypokinesis apical anterior wall, apical septal wall, mid anterior wall, mid inferoseptal wall, basal anterior wall and basal inferoseptal wall    Anti- Anginal medications:                    [x ] not used                       [ ] used                   [ ] not used but strong indication not to use    Ejection Fraction                   [ ] <29            [ ] 30-39%   [ ] 40-49%     [ ]>50%    CHF                   [ ] active (within last 14 days on meds   [ ] Chronic (on meds but no exacerbation)    COPD                   [ ] mild (on chronic bronchodilators)  [ ] moderate (on chronic steroid therapy)      [ ] severe (indication for home O2 or PACO2 >50)    Other risk factors:                     [ ] Previous MI                     [ ] CVA/ stroke                    [ ] carotid stent/ CEA                    [ ] PVD/PAD- (arterial aneurysm, non-palpable pulses, tortuous vessel with inability to insert catheter, infra-renal dissection, renal or subclavian artery stenosis)                    [x ] diabetic                    [ ] previous CABG                    [ ] Renal Failure     Bleeding Risk:1.2%    REVIEW OF SYSTEMS:  CONSTITUTIONAL: No fever, weight loss, or fatigue  CARDIOLOGY: PAtient denies chest pain, shortness of breath or syncopal episodes.   RESPIRATORY: denies shortness of breath, wheezing  NEUROLOGICAL: NO weakness, no focal deficits to report.  ENDOCRINOLOGICAL: no recent change in diabetic medications.   GI: no BRBPR, no N,V,diarrhea.     PHYSICAL EXAM:  · CONSTITUTIONAL:	Well-developed, well nourished    ·RESPIRATORY:   airway patent; breath sounds equal; good air movement; respirations non-labored; clear to auscultation bilaterally; no chest wall tenderness; no intercostal retractions; no rales,rhonchi or wheeze  · CARDIOVASCULAR	regular rate and rhythm  no rub  no murmur  normal PMI  · EXTREMITIES: No cyanosis, clubbing or edema  · VASCULAR: 	Equal and normal pulses (carotid, femoral, dorsalis pedis)  	        EKG: SR (04 Nov 2021 09:03)    OBJECTIVE:  ICU Vital Signs Last 24 Hrs  T(C): 37.1 (09 Nov 2021 08:00), Max: 37.2 (09 Nov 2021 04:20)  T(F): 98.8 (09 Nov 2021 08:00), Max: 98.9 (09 Nov 2021 04:20)  HR: 84 (09 Nov 2021 10:00) (72 - 90)  BP: 124/69 (09 Nov 2021 10:00) (92/53 - 135/78)  BP(mean): 89 (09 Nov 2021 10:00) (65 - 101)  ABP: --  ABP(mean): --  RR: 36 (09 Nov 2021 10:00) (11 - 37)  SpO2: 97% (09 Nov 2021 10:00) (90% - 100%)    I&O's Summary    08 Nov 2021 07:01  -  09 Nov 2021 07:00  --------------------------------------------------------  IN: 1780 mL / OUT: 4025 mL / NET: -2245 mL    09 Nov 2021 07:01  -  09 Nov 2021 10:44  --------------------------------------------------------  IN: 0 mL / OUT: 880 mL / NET: -880 mL      I&O's Detail    08 Nov 2021 07:01  -  09 Nov 2021 07:00  --------------------------------------------------------  IN:    IV PiggyBack: 100 mL    Oral Fluid: 1680 mL  Total IN: 1780 mL    OUT:    Evacuated Tube System (mL): 900 mL    Voided (mL): 3125 mL  Total OUT: 4025 mL    Total NET: -2245 mL      09 Nov 2021 07:01  -  09 Nov 2021 10:44  --------------------------------------------------------  IN:  Total IN: 0 mL    OUT:    Voided (mL): 880 mL  Total OUT: 880 mL    Total NET: -880 mL            CAPILLARY BLOOD GLUCOSE      POCT Blood Glucose.: 133 mg/dL (09 Nov 2021 06:52)  POCT Blood Glucose.: 148 mg/dL (08 Nov 2021 21:34)  POCT Blood Glucose.: 146 mg/dL (08 Nov 2021 16:05)  POCT Blood Glucose.: 144 mg/dL (08 Nov 2021 11:05)    LABS:                          9.0    11.03 )-----------( 157      ( 09 Nov 2021 02:00 )             26.9     11-09    135  |  99  |  29<H>  ----------------------------<  109<H>  4.2   |  23  |  0.8    Ca    8.2<L>      09 Nov 2021 02:00  Mg     2.6     11-09    TPro  5.8<L>  /  Alb  3.9  /  TBili  0.8  /  DBili  x   /  AST  28  /  ALT  33  /  AlkPhos  71  11-09          Home Medications:  Aspir 81 oral delayed release tablet: 1 tab(s) orally once a day (04 Nov 2021 09:13)  atorvastatin 40 mg oral tablet: 1 tab(s) orally once a day (04 Nov 2021 09:13)  metFORMIN 500 mg oral tablet: orally 3 times a day (04 Nov 2021 09:13)  Multiple Vitamins oral capsule: 1 cap(s) orally once a day (04 Nov 2021 09:13)    HOSPITAL MEDICATIONS:  MEDICATIONS  (STANDING):  albuterol/ipratropium for Nebulization 3 milliLiter(s) Nebulizer every 6 hours  aspirin enteric coated 325 milliGRAM(s) Oral daily  atorvastatin 40 milliGRAM(s) Oral at bedtime  chlorhexidine 4% Liquid 1 Application(s) Topical <User Schedule>  dextrose 40% Gel 15 Gram(s) Oral once  dextrose 5%. 1000 milliLiter(s) (50 mL/Hr) IV Continuous <Continuous>  dextrose 5%. 1000 milliLiter(s) (100 mL/Hr) IV Continuous <Continuous>  dextrose 50% Injectable 50 milliLiter(s) IV Push every 15 minutes  dextrose 50% Injectable 25 milliLiter(s) IV Push every 15 minutes  enoxaparin Injectable 40 milliGRAM(s) SubCutaneous daily  famotidine    Tablet 20 milliGRAM(s) Oral two times a day  furosemide   Injectable 40 milliGRAM(s) IV Push daily  glucagon  Injectable 1 milliGRAM(s) IntraMuscular once  guaiFENesin  milliGRAM(s) Oral every 12 hours  insulin lispro (ADMELOG) corrective regimen sliding scale   SubCutaneous three times a day before meals  magnesium sulfate  IVPB 1 Gram(s) IV Intermittent every 12 hours  metFORMIN 500 milliGRAM(s) Oral two times a day  metoprolol tartrate 12.5 milliGRAM(s) Oral every 12 hours  polyethylene glycol 3350 17 Gram(s) Oral daily  potassium chloride    Tablet ER 20 milliEquivalent(s) Oral daily  senna 2 Tablet(s) Oral at bedtime  sodium chloride 0.9%. 1000 milliLiter(s) (20 mL/Hr) IV Continuous <Continuous>    MEDICATIONS  (PRN):  acetaminophen     Tablet .. 650 milliGRAM(s) Oral every 6 hours PRN Temp greater or equal to 38C (100.4F), Mild Pain (1 - 3)  oxyCODONE    IR 10 milliGRAM(s) Oral every 4 hours PRN Severe Pain (7 - 10)  oxyCODONE    IR 5 milliGRAM(s) Oral every 4 hours PRN Moderate Pain (4 - 6)      REVIEW OF SYSTEMS:  CONSTITUTIONAL: [X] all negative; [ ] weakness, [ ] fevers, [ ] chills  EYES/ENT: [X] all negative; [ ] visual changes, [ ] vertigo, [ ] throat pain   NECK: [X] all negative; [ ] pain, [ ] stiffness  RESPIRATORY: [] all negative, [ ] cough, [ ] wheezing, [ ] hemoptysis, [ ] shortness of breath  CARDIOVASCULAR: [] all negative; [ ] chest pain, [ ] palpitations, [ ] orthopnea  GASTROINTESTINAL: [X] all negative; [ ]abdominal pain, [ ] nausea, [ ] vomiting, [ ] hematemesis, [ ] diarrhea, [ ] constipation, [ ] melena, [ ] hematochezia.  GENITOURINARY: [X] all negative; [ ] dysuria, [ ] frequency, [ ] hematuria  NEUROLOGICAL: [X] all negative; [ ] numbness, [ ] weakness  SKIN: [X] all negative; [ ] itching, [ ] burning, [ ] rashes, [ ] lesions   All other review of systems is negative unless indicated above.    [  ] Unable to assess ROS because     PHYSICAL EXAM:          CONSTITUTIONAL: Well-developed; well-nourished; in no acute distress.   	SKIN: warm, dry  	HEAD: Normocephalic; atraumatic.  	EYES: PERRL, EOM, no conj injection, sclera clear  	ENT: No nasal discharge; airway clear.  	NECK: Supple; non tender.  No midline ttp ctls  	CARD: S1, S2 normal; no murmurs, gallops, or rubs. Regular rate and rhythm. 2+ RPs and DPs bilat, no carotid bruits, no pedal   edema, no calf pain b/l  	RESP: CTA  bilat good air movement No wheezes, rales or rhonchi.  	ABD: Soft, not tender, not distended, no CVA ttp no rebound or guarding, bowel sounds present  	EXT: Normal ROM.  No clubbing, cyanosis or edema.   	  	NEURO: Alert, awake, motor 5/5 R, 5/5 L        RADIOLOGY:  xray  < from: Xray Chest 2 Views PA/Lat (11.09.21 @ 08:28) >    IMPRESSION:    Small bilateral pleural effusions.    --- End of Report ---    < end of copied text >    I spent 25 minutes time ; more than 50% of visit was spent counseling and/or examining patient, reviewing vitals, labs, medications, imaging and discussing with the team goals of care to prevent life-threatening in this patient who is at high risk for deterioration or death due to:

## 2021-11-10 ENCOUNTER — NON-APPOINTMENT (OUTPATIENT)
Age: 61
End: 2021-11-10

## 2021-11-11 DIAGNOSIS — I25.84 CORONARY ATHEROSCLEROSIS DUE TO CALCIFIED CORONARY LESION: ICD-10-CM

## 2021-11-11 DIAGNOSIS — R06.89 OTHER ABNORMALITIES OF BREATHING: ICD-10-CM

## 2021-11-11 DIAGNOSIS — E11.65 TYPE 2 DIABETES MELLITUS WITH HYPERGLYCEMIA: ICD-10-CM

## 2021-11-11 DIAGNOSIS — E87.70 FLUID OVERLOAD, UNSPECIFIED: ICD-10-CM

## 2021-11-11 DIAGNOSIS — Z79.84 LONG TERM (CURRENT) USE OF ORAL HYPOGLYCEMIC DRUGS: ICD-10-CM

## 2021-11-11 DIAGNOSIS — I25.118 ATHEROSCLEROTIC HEART DISEASE OF NATIVE CORONARY ARTERY WITH OTHER FORMS OF ANGINA PECTORIS: ICD-10-CM

## 2021-11-11 DIAGNOSIS — Z82.49 FAMILY HISTORY OF ISCHEMIC HEART DISEASE AND OTHER DISEASES OF THE CIRCULATORY SYSTEM: ICD-10-CM

## 2021-11-11 DIAGNOSIS — I31.9 DISEASE OF PERICARDIUM, UNSPECIFIED: ICD-10-CM

## 2021-11-11 DIAGNOSIS — Z79.82 LONG TERM (CURRENT) USE OF ASPIRIN: ICD-10-CM

## 2021-11-11 DIAGNOSIS — D62 ACUTE POSTHEMORRHAGIC ANEMIA: ICD-10-CM

## 2021-11-11 DIAGNOSIS — E87.2 ACIDOSIS: ICD-10-CM

## 2021-11-11 DIAGNOSIS — J90 PLEURAL EFFUSION, NOT ELSEWHERE CLASSIFIED: ICD-10-CM

## 2021-11-11 DIAGNOSIS — E78.5 HYPERLIPIDEMIA, UNSPECIFIED: ICD-10-CM

## 2021-11-12 ENCOUNTER — APPOINTMENT (OUTPATIENT)
Dept: CARE COORDINATION | Facility: HOME HEALTH | Age: 61
End: 2021-11-12
Payer: COMMERCIAL

## 2021-11-12 VITALS — RESPIRATION RATE: 16 BRPM

## 2021-11-12 PROCEDURE — 99024 POSTOP FOLLOW-UP VISIT: CPT

## 2021-11-12 RX ORDER — POLYETHYLENE GLYCOL 3350 17 G/17G
17 POWDER, FOR SOLUTION ORAL DAILY
Refills: 0 | Status: ACTIVE | COMMUNITY
Start: 2021-11-12

## 2021-11-12 RX ORDER — ATORVASTATIN CALCIUM 40 MG/1
40 TABLET, FILM COATED ORAL
Qty: 1 | Refills: 1 | Status: ACTIVE | COMMUNITY
Start: 2021-11-12

## 2021-11-12 RX ORDER — METFORMIN HYDROCHLORIDE 500 MG/1
500 TABLET, COATED ORAL 3 TIMES DAILY
Refills: 0 | Status: ACTIVE | COMMUNITY
Start: 2021-11-12

## 2021-11-12 NOTE — ADDENDUM
[FreeTextEntry1] : c/w apirin/bb/statin\par c/w furosemide BID\par c/w metformin\par start FS monitoring \par c/w tylenol/oxycodone for pain\par c/w Incentive Spirometer\par keep wound clean and dry\par exercise\par follow up with CTU/ Cardiolgy/PCP\par

## 2021-11-12 NOTE — PHYSICAL EXAM
[Exaggerated Use Of Accessory Muscles For Inspiration] : no accessory muscle use [Examination Of The Chest] : the chest was normal in appearance [Chest Visual Inspection Thoracic Asymmetry] : no chest asymmetry [Diminished Respiratory Excursion] : normal chest expansion [FreeTextEntry1] : MSI c/d/i, no erythema or drainage noted, HSI c/d/i no erythema or drainage. scabbing noted CT insertion site. [Skin Color & Pigmentation] : normal skin color and pigmentation [Skin Turgor] : normal skin turgor [] : no rash [No Focal Deficits] : no focal deficits [Oriented To Time, Place, And Person] : oriented to person, place, and time [Impaired Insight] : insight and judgment were intact [Affect] : the affect was normal [Memory Recent] : recent memory was not impaired

## 2021-11-12 NOTE — REASON FOR VISIT
[Home] : at home, [unfilled] , at the time of the visit. [Other Location: e.g. Home (Enter Location, City,State)___] : at [unfilled] [Verbal consent obtained from patient] : the patient, [unfilled] [Post Hospitalization] : a post hospitalization visit [Spouse] : spouse

## 2021-11-12 NOTE — ASSESSMENT
[FreeTextEntry1] : \par Pt recovering well at home s/p OHS. Reviewed all medications and dosages with pt and pt spouse understanding. Pt has all medications in home and is taking as prescribed. Pain controlled with current medication regimen. Pt using occasional oxycodone/ Tylenol with adequate relief.  He has has not had a normal BM, despite using Miralax.  Pt starting MOM today, Colace ordered.   He denies cough, fever, palpitations, LE edema. No further new symptoms, issues or concerns to report at this time.\par

## 2021-11-12 NOTE — HISTORY OF PRESENT ILLNESS
[FreeTextEntry1] : Hospital Course: \par Discharge Date	2021 \par Admission Date	2021 16:48 \par Hospital Course	 \par Patient is a 62y/o Male PMH: DM, HLD, + FH MI (Father  at 65 yoa from \par MI). Pt reports intermittent CP and SOB with activity over past few months, had \par positive stress echo reveals diffuse hypokinesis of apical and septal walls. \par Patient presented for elective LHC which revealed 3vCAD w/LM disease. He was \par admitted post catheterization and underwent myocardial revascularization the \par following day, via CABG. Post-operatively, patient had a right pleural effusion \par s/p thoracentesis which drained 900cc of serosanginous fluid. Patient otherwise \par had an uneventful hospital course. He was discharged home in stable condition \par on POD#4 with instructions to follow up with DR. Goddard on  @ 9:30am. \par \[facility]

## 2021-11-17 ENCOUNTER — APPOINTMENT (OUTPATIENT)
Dept: CARDIOTHORACIC SURGERY | Facility: CLINIC | Age: 61
End: 2021-11-17
Payer: COMMERCIAL

## 2021-11-17 ENCOUNTER — NON-APPOINTMENT (OUTPATIENT)
Age: 61
End: 2021-11-17

## 2021-11-17 VITALS
TEMPERATURE: 98.5 F | OXYGEN SATURATION: 97 % | HEART RATE: 83 BPM | RESPIRATION RATE: 18 BRPM | BODY MASS INDEX: 29.7 KG/M2 | HEIGHT: 68 IN | SYSTOLIC BLOOD PRESSURE: 126 MMHG | DIASTOLIC BLOOD PRESSURE: 76 MMHG | WEIGHT: 196 LBS

## 2021-11-17 LAB
ALBUMIN SERPL ELPH-MCNC: 4.2 G/DL
ALP BLD-CCNC: 92 U/L
ALT SERPL-CCNC: 14 U/L
ANION GAP SERPL CALC-SCNC: 12 MMOL/L
AST SERPL-CCNC: 13 U/L
BASOPHILS # BLD AUTO: 0.1 K/UL
BASOPHILS NFR BLD AUTO: 0.9 %
BILIRUB SERPL-MCNC: 0.5 MG/DL
BUN SERPL-MCNC: 21 MG/DL
CALCIUM SERPL-MCNC: 9.4 MG/DL
CHLORIDE SERPL-SCNC: 103 MMOL/L
CO2 SERPL-SCNC: 25 MMOL/L
CREAT SERPL-MCNC: 0.8 MG/DL
EOSINOPHIL # BLD AUTO: 0.12 K/UL
EOSINOPHIL NFR BLD AUTO: 1.1 %
GLUCOSE SERPL-MCNC: 129 MG/DL
HCT VFR BLD CALC: 36.4 %
HGB BLD-MCNC: 11.5 G/DL
IMM GRANULOCYTES NFR BLD AUTO: 1 %
LYMPHOCYTES # BLD AUTO: 2.59 K/UL
LYMPHOCYTES NFR BLD AUTO: 23.1 %
MAN DIFF?: NORMAL
MCHC RBC-ENTMCNC: 30 PG
MCHC RBC-ENTMCNC: 31.6 G/DL
MCV RBC AUTO: 95 FL
MONOCYTES # BLD AUTO: 0.84 K/UL
MONOCYTES NFR BLD AUTO: 7.5 %
NEUTROPHILS # BLD AUTO: 7.45 K/UL
NEUTROPHILS NFR BLD AUTO: 66.4 %
PLATELET # BLD AUTO: 396 K/UL
POTASSIUM SERPL-SCNC: 5.9 MMOL/L
PROT SERPL-MCNC: 7 G/DL
RBC # BLD: 3.83 M/UL
RBC # FLD: 14.1 %
SODIUM SERPL-SCNC: 140 MMOL/L
WBC # FLD AUTO: 11.21 K/UL

## 2021-11-17 PROCEDURE — 99024 POSTOP FOLLOW-UP VISIT: CPT

## 2021-11-18 LAB
ANION GAP SERPL CALC-SCNC: 11 MMOL/L
BUN SERPL-MCNC: 21 MG/DL
CALCIUM SERPL-MCNC: 9.5 MG/DL
CHLORIDE SERPL-SCNC: 102 MMOL/L
CO2 SERPL-SCNC: 26 MMOL/L
CREAT SERPL-MCNC: 0.8 MG/DL
GLUCOSE SERPL-MCNC: 120 MG/DL
POTASSIUM SERPL-SCNC: 5.5 MMOL/L
SODIUM SERPL-SCNC: 139 MMOL/L

## 2021-11-24 ENCOUNTER — RESULT REVIEW (OUTPATIENT)
Age: 61
End: 2021-11-24

## 2021-11-24 ENCOUNTER — APPOINTMENT (OUTPATIENT)
Dept: CARDIOTHORACIC SURGERY | Facility: CLINIC | Age: 61
End: 2021-11-24
Payer: COMMERCIAL

## 2021-11-24 ENCOUNTER — OUTPATIENT (OUTPATIENT)
Dept: OUTPATIENT SERVICES | Facility: HOSPITAL | Age: 61
LOS: 1 days | Discharge: HOME | End: 2021-11-24
Payer: MEDICAID

## 2021-11-24 VITALS
HEART RATE: 82 BPM | OXYGEN SATURATION: 98 % | WEIGHT: 191 LBS | DIASTOLIC BLOOD PRESSURE: 83 MMHG | RESPIRATION RATE: 12 BRPM | SYSTOLIC BLOOD PRESSURE: 135 MMHG | TEMPERATURE: 98.3 F | BODY MASS INDEX: 28.95 KG/M2 | HEIGHT: 68 IN

## 2021-11-24 DIAGNOSIS — Z98.890 OTHER SPECIFIED POSTPROCEDURAL STATES: Chronic | ICD-10-CM

## 2021-11-24 DIAGNOSIS — Z95.1 PRESENCE OF AORTOCORONARY BYPASS GRAFT: ICD-10-CM

## 2021-11-24 LAB
ALBUMIN SERPL ELPH-MCNC: 4.4 G/DL
ALP BLD-CCNC: 103 U/L
ALT SERPL-CCNC: 12 U/L
ANION GAP SERPL CALC-SCNC: 14 MMOL/L
AST SERPL-CCNC: 14 U/L
BASOPHILS # BLD AUTO: 0.06 K/UL
BASOPHILS NFR BLD AUTO: 0.8 %
BILIRUB SERPL-MCNC: 0.7 MG/DL
BUN SERPL-MCNC: 20 MG/DL
CALCIUM SERPL-MCNC: 9.6 MG/DL
CHLORIDE SERPL-SCNC: 100 MMOL/L
CO2 SERPL-SCNC: 24 MMOL/L
CREAT SERPL-MCNC: 0.7 MG/DL
EOSINOPHIL # BLD AUTO: 0.12 K/UL
EOSINOPHIL NFR BLD AUTO: 1.5 %
GLUCOSE SERPL-MCNC: 114 MG/DL
HCT VFR BLD CALC: 38.8 %
HGB BLD-MCNC: 12.2 G/DL
IMM GRANULOCYTES NFR BLD AUTO: 0.4 %
LYMPHOCYTES # BLD AUTO: 1.81 K/UL
LYMPHOCYTES NFR BLD AUTO: 23.3 %
MAN DIFF?: NORMAL
MCHC RBC-ENTMCNC: 29.9 PG
MCHC RBC-ENTMCNC: 31.4 G/DL
MCV RBC AUTO: 95.1 FL
MONOCYTES # BLD AUTO: 0.52 K/UL
MONOCYTES NFR BLD AUTO: 6.7 %
NEUTROPHILS # BLD AUTO: 5.22 K/UL
NEUTROPHILS NFR BLD AUTO: 67.3 %
PLATELET # BLD AUTO: 403 K/UL
POTASSIUM SERPL-SCNC: 4.9 MMOL/L
PROT SERPL-MCNC: 7.4 G/DL
RBC # BLD: 4.08 M/UL
RBC # FLD: 13.8 %
SODIUM SERPL-SCNC: 138 MMOL/L
WBC # FLD AUTO: 7.76 K/UL

## 2021-11-24 PROCEDURE — 71046 X-RAY EXAM CHEST 2 VIEWS: CPT | Mod: 26

## 2021-11-24 PROCEDURE — 99024 POSTOP FOLLOW-UP VISIT: CPT

## 2021-12-06 ENCOUNTER — FORM ENCOUNTER (OUTPATIENT)
Age: 61
End: 2021-12-06

## 2021-12-08 ENCOUNTER — APPOINTMENT (OUTPATIENT)
Dept: CARDIOTHORACIC SURGERY | Facility: CLINIC | Age: 61
End: 2021-12-08
Payer: COMMERCIAL

## 2021-12-08 VITALS
TEMPERATURE: 98.4 F | OXYGEN SATURATION: 98 % | HEIGHT: 68 IN | HEART RATE: 73 BPM | BODY MASS INDEX: 29.55 KG/M2 | WEIGHT: 195 LBS | RESPIRATION RATE: 14 BRPM | SYSTOLIC BLOOD PRESSURE: 133 MMHG | DIASTOLIC BLOOD PRESSURE: 85 MMHG

## 2021-12-08 PROCEDURE — 99024 POSTOP FOLLOW-UP VISIT: CPT

## 2021-12-08 RX ORDER — DOCUSATE SODIUM 100 MG/1
100 CAPSULE, LIQUID FILLED ORAL TWICE DAILY
Qty: 30 | Refills: 0 | Status: DISCONTINUED | COMMUNITY
Start: 2021-11-12 | End: 2021-12-08

## 2021-12-08 RX ORDER — OXYCODONE AND ACETAMINOPHEN 5; 325 MG/1; MG/1
5-325 TABLET ORAL
Refills: 0 | Status: DISCONTINUED | COMMUNITY
Start: 2021-11-12 | End: 2021-12-08

## 2021-12-08 RX ORDER — FUROSEMIDE 40 MG/1
40 TABLET ORAL DAILY
Qty: 90 | Refills: 3 | Status: DISCONTINUED | COMMUNITY
Start: 2021-11-12 | End: 2021-12-08

## 2021-12-08 RX ORDER — POTASSIUM CHLORIDE 1500 MG/1
20 TABLET, EXTENDED RELEASE ORAL DAILY
Refills: 0 | Status: DISCONTINUED | COMMUNITY
Start: 2021-11-12 | End: 2021-12-08

## 2021-12-13 ENCOUNTER — TRANSCRIPTION ENCOUNTER (OUTPATIENT)
Age: 61
End: 2021-12-13

## 2022-02-11 LAB — GLUCOSE BLDC GLUCOMTR-MCNC: 97 MG/DL — SIGNIFICANT CHANGE UP (ref 70–99)

## 2022-02-14 LAB — GLUCOSE BLDC GLUCOMTR-MCNC: 90 MG/DL — SIGNIFICANT CHANGE UP (ref 70–99)

## 2022-02-16 LAB — GLUCOSE BLDC GLUCOMTR-MCNC: 85 MG/DL — SIGNIFICANT CHANGE UP (ref 70–99)

## 2022-02-18 LAB — GLUCOSE BLDC GLUCOMTR-MCNC: 74 MG/DL — SIGNIFICANT CHANGE UP (ref 70–99)

## 2022-02-23 LAB — GLUCOSE BLDC GLUCOMTR-MCNC: 85 MG/DL — SIGNIFICANT CHANGE UP (ref 70–99)

## 2022-03-09 ENCOUNTER — FORM ENCOUNTER (OUTPATIENT)
Age: 62
End: 2022-03-09

## 2022-03-24 ENCOUNTER — FORM ENCOUNTER (OUTPATIENT)
Age: 62
End: 2022-03-24

## 2022-05-20 ENCOUNTER — OUTPATIENT (OUTPATIENT)
Dept: OUTPATIENT SERVICES | Facility: HOSPITAL | Age: 62
LOS: 1 days | Discharge: HOME | End: 2022-05-20

## 2022-05-20 DIAGNOSIS — Z48.812 ENCOUNTER FOR SURGICAL AFTERCARE FOLLOWING SURGERY ON THE CIRCULATORY SYSTEM: ICD-10-CM

## 2022-05-20 DIAGNOSIS — Z98.890 OTHER SPECIFIED POSTPROCEDURAL STATES: Chronic | ICD-10-CM

## 2022-05-20 DIAGNOSIS — I25.810 ATHEROSCLEROSIS OF CORONARY ARTERY BYPASS GRAFT(S) WITHOUT ANGINA PECTORIS: ICD-10-CM

## 2022-07-26 ENCOUNTER — NON-APPOINTMENT (OUTPATIENT)
Age: 62
End: 2022-07-26

## 2022-07-26 RX ORDER — ELECTROLYTES/DEXTROSE
SOLUTION, ORAL ORAL
Refills: 0 | Status: ACTIVE | COMMUNITY

## 2022-08-08 ENCOUNTER — APPOINTMENT (OUTPATIENT)
Dept: CARDIOLOGY | Facility: CLINIC | Age: 62
End: 2022-08-08

## 2022-08-08 VITALS
SYSTOLIC BLOOD PRESSURE: 120 MMHG | WEIGHT: 213 LBS | HEIGHT: 67 IN | HEART RATE: 66 BPM | BODY MASS INDEX: 33.43 KG/M2 | OXYGEN SATURATION: 97 % | DIASTOLIC BLOOD PRESSURE: 70 MMHG

## 2022-08-08 DIAGNOSIS — Z95.1 PRESENCE OF AORTOCORONARY BYPASS GRAFT: ICD-10-CM

## 2022-08-08 PROCEDURE — 99214 OFFICE O/P EST MOD 30 MIN: CPT

## 2022-08-08 PROCEDURE — 93000 ELECTROCARDIOGRAM COMPLETE: CPT

## 2022-08-08 NOTE — CARDIOLOGY SUMMARY
[de-identified] : 8/8/2022: NSR, normal axis, normal intervals.  [de-identified] : 10/25/2021: Developed 2 mm ST depressions in leads II, III, aVF, and V4-V6, which resolved with rest. Stress echocardiogram images revealed apical hypokinesis and basal mid-inferoseptal hypokinesis.

## 2022-08-08 NOTE — HISTORY OF PRESENT ILLNESS
[FreeTextEntry1] : JOVANNY BENITO is a 62-year-old male who presents today for follow up visit s/p bypass surgery. Denies any new complaints. Reports he feels well. The patient underwent cardiac rehab and is able to walk without chest pain or SOB. States he is having a hard time splitting his Metoprolol 25 mg into 12.5 mg BID and is now requesting a once per day pill. \par \par \par

## 2022-08-08 NOTE — PHYSICAL EXAM
[Well Developed] : well developed [Well Nourished] : well nourished [No Acute Distress] : no acute distress [Normal Conjunctiva] : normal conjunctiva [Normal Venous Pressure] : normal venous pressure [No Carotid Bruit] : no carotid bruit [Normal S1, S2] : normal S1, S2 [No Murmur] : no murmur [No Rub] : no rub [No Gallop] : no gallop [Clear Lung Fields] : clear lung fields [Good Air Entry] : good air entry [No Respiratory Distress] : no respiratory distress  [Soft] : abdomen soft [Non Tender] : non-tender [No Masses/organomegaly] : no masses/organomegaly [Normal Bowel Sounds] : normal bowel sounds [Normal Gait] : normal gait [No Edema] : no edema [No Cyanosis] : no cyanosis [No Clubbing] : no clubbing [No Varicosities] : no varicosities [No Rash] : no rash [No Skin Lesions] : no skin lesions [Moves all extremities] : moves all extremities [No Focal Deficits] : no focal deficits [Normal Speech] : normal speech [Alert and Oriented] : alert and oriented [Normal memory] : normal memory [de-identified] : Well-healing midline and left radial scars.

## 2022-08-08 NOTE — DISCUSSION/SUMMARY
[Hyperlipidemia] : hyperlipidemia [None] : There are no changes in medication management [Diet Modification] : diet modification [Exercise] : exercise [Hypertension] : hypertension [Stable] : stable [Medication Changes Per Orders] : Medication changes are as documented in orders [Exercise Regimen] : an exercise regimen [Weight Loss] : weight loss [ETOH Moderation] : alcohol moderation [Low Sodium Diet] : low sodium diet [Patient] : the patient [EKG obtained to assist in diagnosis and management of assessed problem(s)] : EKG obtained to assist in diagnosis and management of assessed problem(s) [de-identified] : Start Metoprolol 25 mg QD.  [FreeTextEntry1] : CAD s/p CABG: EKG performed today was unremarkable. Currently stable. Patient completed cardiac rehab. Continue Aspirin and statin therapy. \par Follow up in 6 months.

## 2022-10-04 NOTE — DISCHARGE NOTE PROVIDER - NSDCCPTREATMENT_GEN_ALL_CORE_FT
yes PRINCIPAL PROCEDURE  Procedure: CABG, 2 arterial and 3 venous  Findings and Treatment:       SECONDARY PROCEDURE  Procedure: CABG, 2 arterial and 3 venous  Findings and Treatment:

## 2022-10-13 NOTE — PATIENT PROFILE ADULT - LIVING ENVIRONMENT
[FreeTextEntry1] : HEP- not interested in formal therapy\par \par The patient was advised of the diagnosis. The natural history of the pathology was explained in full to the patient in layman's terms. All questions were answered.  no

## 2023-02-13 ENCOUNTER — APPOINTMENT (OUTPATIENT)
Dept: CARDIOLOGY | Facility: CLINIC | Age: 63
End: 2023-02-13
Payer: COMMERCIAL

## 2023-02-13 VITALS
SYSTOLIC BLOOD PRESSURE: 147 MMHG | HEART RATE: 63 BPM | HEIGHT: 67 IN | DIASTOLIC BLOOD PRESSURE: 80 MMHG | WEIGHT: 211 LBS | OXYGEN SATURATION: 98 % | BODY MASS INDEX: 33.12 KG/M2

## 2023-02-13 PROCEDURE — 93000 ELECTROCARDIOGRAM COMPLETE: CPT

## 2023-02-13 PROCEDURE — 99214 OFFICE O/P EST MOD 30 MIN: CPT

## 2023-02-13 RX ORDER — METOPROLOL TARTRATE 25 MG/1
25 TABLET, FILM COATED ORAL TWICE DAILY
Refills: 0 | Status: DISCONTINUED | COMMUNITY
Start: 2021-11-12 | End: 2023-02-13

## 2023-02-14 NOTE — DISCUSSION/SUMMARY
[EKG obtained to assist in diagnosis and management of assessed problem(s)] : EKG obtained to assist in diagnosis and management of assessed problem(s) [FreeTextEntry1] : EKG performed today was unremarkable.\par \par CAD: The impression is coronary artery disease. Currently, the condition is stable. There are no changes in medication management. Continue Aspirin 81 mg QD. \par \par HLD: The impression is hyperlipidemia. Currently, the condition is stable. There are no changes in medication management. Continue statin therapy. Other planned treatment includes diet modification, exercise, and weight loss. Check lipid profile. \par \par DM: The impression is diabetes. There are no changes in medication management. Patient advised to continue taking medications as prescribed, closely monitor blood sugar at home, follow a diabetic diet, and follow up for continued monitoring.\par \par Instructed to follow up in 6 months. \par Plan was discussed with the patient.

## 2023-02-14 NOTE — HISTORY OF PRESENT ILLNESS
[FreeTextEntry1] : JOVANNY BENITO is a 62-year-old male, with a PMHx significant for CAD s/p CABG, HLD, and DM, who presents today for a follow-up visit. Patient is currently stable on medical therapy. Reports he exercises daily. Denies any new complaints. Reports he is feeling well. Otherwise: (-) chest pain, (-) SOB.\par \par \par \par

## 2023-07-10 ENCOUNTER — APPOINTMENT (OUTPATIENT)
Dept: CARDIOLOGY | Facility: CLINIC | Age: 63
End: 2023-07-10
Payer: COMMERCIAL

## 2023-07-10 VITALS
BODY MASS INDEX: 32.96 KG/M2 | DIASTOLIC BLOOD PRESSURE: 84 MMHG | SYSTOLIC BLOOD PRESSURE: 130 MMHG | RESPIRATION RATE: 16 BRPM | HEIGHT: 67 IN | WEIGHT: 210 LBS | HEART RATE: 66 BPM | OXYGEN SATURATION: 95 %

## 2023-07-10 PROCEDURE — 99214 OFFICE O/P EST MOD 30 MIN: CPT

## 2023-07-10 PROCEDURE — 93000 ELECTROCARDIOGRAM COMPLETE: CPT

## 2023-07-10 RX ORDER — CIPROFLOXACIN HYDROCHLORIDE 500 MG/1
500 TABLET, FILM COATED ORAL
Qty: 20 | Refills: 0 | Status: DISCONTINUED | COMMUNITY
Start: 2023-03-13

## 2023-07-10 RX ORDER — METRONIDAZOLE 500 MG/1
500 TABLET ORAL
Qty: 20 | Refills: 0 | Status: DISCONTINUED | COMMUNITY
Start: 2023-03-13

## 2023-07-11 LAB
ALBUMIN SERPL ELPH-MCNC: 4.6 G/DL
ALP BLD-CCNC: 87 U/L
ALT SERPL-CCNC: 19 U/L
ANION GAP SERPL CALC-SCNC: 10 MMOL/L
APPEARANCE: CLEAR
AST SERPL-CCNC: 18 U/L
BILIRUB SERPL-MCNC: 1 MG/DL
BILIRUBIN URINE: NEGATIVE
BLOOD URINE: NEGATIVE
BUN SERPL-MCNC: 21 MG/DL
CALCIUM SERPL-MCNC: 9.8 MG/DL
CHLORIDE SERPL-SCNC: 101 MMOL/L
CHOLEST SERPL-MCNC: 138 MG/DL
CO2 SERPL-SCNC: 27 MMOL/L
COLOR: YELLOW
CREAT SERPL-MCNC: 0.9 MG/DL
EGFR: 95 ML/MIN/1.73M2
ESTIMATED AVERAGE GLUCOSE: 174 MG/DL
GLUCOSE QUALITATIVE U: >=1000 MG/DL
GLUCOSE SERPL-MCNC: 175 MG/DL
HBA1C MFR BLD HPLC: 7.7 %
HDLC SERPL-MCNC: 38 MG/DL
KETONES URINE: NEGATIVE MG/DL
LDLC SERPL CALC-MCNC: 70 MG/DL
LEUKOCYTE ESTERASE URINE: NEGATIVE
NITRITE URINE: NEGATIVE
NONHDLC SERPL-MCNC: 100 MG/DL
PH URINE: 5.5
POTASSIUM SERPL-SCNC: 4.8 MMOL/L
PROT SERPL-MCNC: 7 G/DL
PROTEIN URINE: NEGATIVE MG/DL
SODIUM SERPL-SCNC: 138 MMOL/L
SPECIFIC GRAVITY URINE: 1.04
TRIGL SERPL-MCNC: 149 MG/DL
TSH SERPL-ACNC: 2.05 UIU/ML
UROBILINOGEN URINE: 0.2 MG/DL

## 2023-07-12 NOTE — DISCUSSION/SUMMARY
[EKG obtained to assist in diagnosis and management of assessed problem(s)] : EKG obtained to assist in diagnosis and management of assessed problem(s) [FreeTextEntry1] : CAD: The impression is coronary artery disease. Currently, the condition is stable. Patient is asymptomatic. There are no changes in medication management. Continue current medical therapy. Other planned treatment includes dietary modification, an exercise regimen, and weight reduction.\par \par HLD: The impression is hyperlipidemia. Currently, the condition is stable. There are no changes in medication management. Continue current medical therapy. Other planned treatment includes diet modification, exercise, and weight loss.\par \par DM: The impression is diabetes mellitus. Currently, the condition is uncontrolled. Patient wishes to do diet, exercise, and weight loss. Will follow up with PCP, Dr. Sosa. There are no changes in medication management. Patient advised to continue taking medications as prescribed, closely monitor blood sugar at home, follow a diabetic diet, and follow up for continued monitoring.\par \par Lab requisition provided to check A1CG, CBC, CMP, Lipid Profile, and UA. \par \par Instructed to follow up in 6 months. \par Plan was discussed with the patient.

## 2023-07-12 NOTE — HISTORY OF PRESENT ILLNESS
[FreeTextEntry1] : JOVANNY BENITO is a 63-year-old male, with a PMHx significant for CAD s/p CABG, HLD, and DM, who presents today for a follow-up visit. Recent lab work revealed A1c of 7.7. Patient admits to dietary noncompliance. Reports he exercises and walks daily. Denies any other complaints. Reports he is feeling well. Otherwise: (-) chest pain, (-) SOB.

## 2023-11-15 ENCOUNTER — APPOINTMENT (OUTPATIENT)
Dept: CARDIOLOGY | Facility: CLINIC | Age: 63
End: 2023-11-15

## 2023-12-13 NOTE — REASON FOR VISIT
Onset: today   Location / description: slight fever last week, was better yesterday, warm today again, runny nose, congested, mild cough, temp 101.3F   Associated Symptoms: wake up from coughing  What improves / worsens symptoms: tylenol/motrin   Symptom specific medications: tylenol/motrin, motrin helps more  Intake and Output (if dehydration suspected, use .AAHNTDEHYRDATION): eating/drinking, wetting diaper   Activity level: sleeping   Temperature (route and time): 101.3F armpit   Weight: No data recorded  Recent visits (last 3-4 weeks) for same reason or recent surgery: N/A     PLAN:   Directed to Urgent Care  Triage done w/ mom, per mom pt has new pt appt w/ Dr. Campoverde but not until 2/5/24.  Mom requesting if pt can be seen sooner. No available appt w/ any provider in [place] and [place] location. Advised mom to bring pt to /[facility], location and hours information provided to mom. Mom verbalized understanding.   Advised ED if symptoms worsens, mom verbalized understanding.       Patient/Caller unsure if mom will follow recommendations.    Reason for Disposition   Fever returns after going away > 24 hours and symptoms worse or not improved   Fever present > 3 days    Protocols used: Cough-P-OH    
Regarding: [place]/27month/m/sneezing, fever 101  ----- Message from Nany Chu sent at 12/13/2023  8:14 AM CST -----  Patient Name: Luz Maria Sanchez    Specialist or PCP Name: no pcp    Symptoms: sneezing, fever 101    Pregnant (females aged 13-60. If Yes, how long?) : n/a    Call Back # : [phone]    Which State are you currently located in?: [place]    Name of Clinic Site / Acct# : [facility] [address]     Use following scripting for patients waiting for a callback:   \"Nurse callback times vary based on call volumes; please be aware the return phone call may come from an unidentified or out of state phone number. If your symptoms worsen or become life threatening while waiting, you should seek immediate assistance by calling 911 or going to the ER for evaluation.\"    
[Other: ____] : [unfilled]

## 2024-03-04 ENCOUNTER — RX RENEWAL (OUTPATIENT)
Age: 64
End: 2024-03-04

## 2024-03-25 ENCOUNTER — APPOINTMENT (OUTPATIENT)
Dept: OTOLARYNGOLOGY | Facility: CLINIC | Age: 64
End: 2024-03-25
Payer: COMMERCIAL

## 2024-03-25 VITALS — HEIGHT: 67 IN | BODY MASS INDEX: 31.39 KG/M2 | WEIGHT: 200 LBS

## 2024-03-25 DIAGNOSIS — R04.0 EPISTAXIS: ICD-10-CM

## 2024-03-25 DIAGNOSIS — Z79.01 LONG TERM (CURRENT) USE OF ANTICOAGULANTS: ICD-10-CM

## 2024-03-25 PROCEDURE — 99202 OFFICE O/P NEW SF 15 MIN: CPT | Mod: 25

## 2024-03-25 PROCEDURE — 31238 NSL/SINS NDSC SRG NSL HEMRRG: CPT

## 2024-03-25 NOTE — PROCEDURE
[Epistaxis] : evaluation of epistaxis [None] : none [Rigid Endoscope] : examined with a rigid endoscope [FreeTextEntry6] : Bilateral nasal endoscopy performed. No mucosal masses or lesions. Bilateral ostiomeatal complexes are clear without discharge.  Right anterior caudal septum with fresh area of active bleeding, controlled with endoscopically directed cautery and Surgicel

## 2024-03-25 NOTE — HISTORY OF PRESENT ILLNESS
[de-identified] : Patient presents today for c/o nosebleeds. He gets multiple bleeds a week that only last for 10-15  minutes each, He states every winter he gets this issue. He has a history of being cauterized on the right side several decades ago. No further complaints.   On baby Aspirin. No prior sinonasal surgery. No history of easy bruising or lbeeding.

## 2024-04-29 ENCOUNTER — APPOINTMENT (OUTPATIENT)
Dept: OTOLARYNGOLOGY | Facility: CLINIC | Age: 64
End: 2024-04-29

## 2024-05-16 ENCOUNTER — NON-APPOINTMENT (OUTPATIENT)
Age: 64
End: 2024-05-16

## 2024-05-16 LAB
ALBUMIN SERPL ELPH-MCNC: 4.6 G/DL
ALP BLD-CCNC: 82 U/L
ALT SERPL-CCNC: 17 U/L
ANION GAP SERPL CALC-SCNC: 12 MMOL/L
APPEARANCE: CLEAR
AST SERPL-CCNC: 17 U/L
BILIRUB SERPL-MCNC: 0.9 MG/DL
BILIRUBIN URINE: NEGATIVE
BLOOD URINE: NEGATIVE
BUN SERPL-MCNC: 15 MG/DL
CALCIUM SERPL-MCNC: 9.6 MG/DL
CHLORIDE SERPL-SCNC: 101 MMOL/L
CHOLEST SERPL-MCNC: 125 MG/DL
CO2 SERPL-SCNC: 27 MMOL/L
COLOR: NORMAL
CREAT SERPL-MCNC: 0.9 MG/DL
EGFR: 95 ML/MIN/1.73M2
ESTIMATED AVERAGE GLUCOSE: 177 MG/DL
GLUCOSE QUALITATIVE U: NEGATIVE MG/DL
GLUCOSE SERPL-MCNC: 161 MG/DL
HBA1C MFR BLD HPLC: 7.8 %
HDLC SERPL-MCNC: 36 MG/DL
KETONES URINE: NEGATIVE MG/DL
LDLC SERPL CALC-MCNC: 61 MG/DL
LEUKOCYTE ESTERASE URINE: NEGATIVE
NITRITE URINE: NEGATIVE
NONHDLC SERPL-MCNC: 89 MG/DL
PH URINE: 5.5
POTASSIUM SERPL-SCNC: 4.9 MMOL/L
PROT SERPL-MCNC: 7 G/DL
PROTEIN URINE: NORMAL MG/DL
SODIUM SERPL-SCNC: 140 MMOL/L
SPECIFIC GRAVITY URINE: 1.02
TRIGL SERPL-MCNC: 142 MG/DL
UROBILINOGEN URINE: 1 MG/DL

## 2024-05-20 ENCOUNTER — APPOINTMENT (OUTPATIENT)
Dept: CARDIOLOGY | Facility: CLINIC | Age: 64
End: 2024-05-20
Payer: COMMERCIAL

## 2024-05-20 VITALS
OXYGEN SATURATION: 97 % | HEART RATE: 61 BPM | SYSTOLIC BLOOD PRESSURE: 136 MMHG | WEIGHT: 225 LBS | HEIGHT: 67 IN | BODY MASS INDEX: 35.31 KG/M2 | RESPIRATION RATE: 16 BRPM | DIASTOLIC BLOOD PRESSURE: 70 MMHG

## 2024-05-20 DIAGNOSIS — E66.9 OBESITY, UNSPECIFIED: ICD-10-CM

## 2024-05-20 DIAGNOSIS — I25.10 ATHEROSCLEROTIC HEART DISEASE OF NATIVE CORONARY ARTERY W/OUT ANGINA PECTORIS: ICD-10-CM

## 2024-05-20 DIAGNOSIS — E11.9 TYPE 2 DIABETES MELLITUS W/OUT COMPLICATIONS: ICD-10-CM

## 2024-05-20 DIAGNOSIS — E78.5 HYPERLIPIDEMIA, UNSPECIFIED: ICD-10-CM

## 2024-05-20 DIAGNOSIS — I10 ESSENTIAL (PRIMARY) HYPERTENSION: ICD-10-CM

## 2024-05-20 PROCEDURE — 99214 OFFICE O/P EST MOD 30 MIN: CPT

## 2024-05-20 PROCEDURE — G2211 COMPLEX E/M VISIT ADD ON: CPT

## 2024-05-20 RX ORDER — ASPIRIN 325 MG/1
325 TABLET, FILM COATED ORAL
Refills: 0 | Status: DISCONTINUED | COMMUNITY
End: 2024-05-20

## 2024-05-20 RX ORDER — GLYBURIDE 2.5 MG/1
2.5 TABLET ORAL DAILY
Refills: 0 | Status: ACTIVE | COMMUNITY
Start: 2024-05-20

## 2024-05-20 RX ORDER — ASPIRIN ENTERIC COATED TABLETS 81 MG 81 MG/1
81 TABLET, DELAYED RELEASE ORAL DAILY
Refills: 0 | Status: ACTIVE | COMMUNITY
Start: 2024-05-20

## 2024-05-20 RX ORDER — EMPAGLIFLOZIN 10 MG/1
10 TABLET, FILM COATED ORAL
Refills: 0 | Status: DISCONTINUED | COMMUNITY
End: 2024-05-20

## 2024-05-21 NOTE — DISCUSSION/SUMMARY
[FreeTextEntry1] : CAD: The impression is coronary artery disease. Currently, the condition is stable. Patient is asymptomatic. There are no changes in medication management. Continue current medical therapy. Other planned treatment includes dietary modification, an exercise regimen, and weight reduction.  HTN: The impression is hypertension. Currently, the condition is controlled. There are no changes in medication management. Continue current medical therapy. Other planned treatments include an exercise regimen, weight loss, low sodium diet, and alcohol moderation.  HLD: The impression is hyperlipidemia. Currently, the condition is stable. LDL is 61. There are no changes in medication management. Continue current medical therapy. Other planned treatment includes diet modification, exercise, and weight loss.  DM: The impression is diabetes mellitus. Currently, the condition is uncontrolled. Patient advised to follow up with PCP. There are no changes in medication management at this time. Patient advised to continue taking medications as prescribed, closely monitor blood sugar at home, follow a diabetic diet, exercise, lose weight, and follow up for continued monitoring. Discussed importance of diet and exercise to improve glycemic control.  Obesity: Discussed risks of obesity with the patient. Counselled on weight loss with dietary modification and increased physical activity/exercise.  Instructed to follow up in 6 months.  Plan was discussed with the patient.

## 2024-05-21 NOTE — HISTORY OF PRESENT ILLNESS
[FreeTextEntry1] : JOVANNY BENITO is a 64-year-old male, with a PMHx significant for CAD s/p CABG, HTN, HLD, and DM (A1c 7.8), who presents today for a follow-up visit. Denies any new complaints. Reports he is feeling well. Patient's weight is now 225 pounds; he has gained 15 pounds since his last visit on 7/10/2023. Labs reveal LDL of 61. Otherwise: (-) chest pain, (-) SOB.   Labs from 5/14/2024 reviewed:  Total Cholesterol: 125 HDL: 36 Triglycerides: 142 LDL: 61 Non-HDL-C: 89 Glucose: 161 Hgb A1c: 7.8 Estimated Average Glucose: 177

## 2024-05-30 ENCOUNTER — RX RENEWAL (OUTPATIENT)
Age: 64
End: 2024-05-30

## 2024-05-30 RX ORDER — METOPROLOL SUCCINATE 25 MG/1
25 TABLET, EXTENDED RELEASE ORAL DAILY
Qty: 90 | Refills: 3 | Status: ACTIVE | COMMUNITY
Start: 2022-08-08 | End: 1900-01-01

## 2024-07-02 ENCOUNTER — EMERGENCY (EMERGENCY)
Facility: HOSPITAL | Age: 64
LOS: 0 days | Discharge: HOME | End: 2024-07-02
Attending: EMERGENCY MEDICINE
Payer: COMMERCIAL

## 2024-07-02 VITALS
HEART RATE: 81 BPM | OXYGEN SATURATION: 97 % | RESPIRATION RATE: 18 BRPM | WEIGHT: 220.02 LBS | TEMPERATURE: 98 F | DIASTOLIC BLOOD PRESSURE: 80 MMHG | HEIGHT: 67 IN | SYSTOLIC BLOOD PRESSURE: 160 MMHG

## 2024-07-02 VITALS
OXYGEN SATURATION: 97 % | TEMPERATURE: 98 F | SYSTOLIC BLOOD PRESSURE: 131 MMHG | HEART RATE: 74 BPM | RESPIRATION RATE: 20 BRPM | DIASTOLIC BLOOD PRESSURE: 76 MMHG

## 2024-07-02 DIAGNOSIS — K59.00 CONSTIPATION, UNSPECIFIED: ICD-10-CM

## 2024-07-02 DIAGNOSIS — Z98.890 OTHER SPECIFIED POSTPROCEDURAL STATES: Chronic | ICD-10-CM

## 2024-07-02 DIAGNOSIS — R31.9 HEMATURIA, UNSPECIFIED: ICD-10-CM

## 2024-07-02 DIAGNOSIS — Z79.82 LONG TERM (CURRENT) USE OF ASPIRIN: ICD-10-CM

## 2024-07-02 DIAGNOSIS — I10 ESSENTIAL (PRIMARY) HYPERTENSION: ICD-10-CM

## 2024-07-02 DIAGNOSIS — Z95.1 PRESENCE OF AORTOCORONARY BYPASS GRAFT: ICD-10-CM

## 2024-07-02 DIAGNOSIS — N42.9 DISORDER OF PROSTATE, UNSPECIFIED: ICD-10-CM

## 2024-07-02 DIAGNOSIS — Z87.442 PERSONAL HISTORY OF URINARY CALCULI: ICD-10-CM

## 2024-07-02 DIAGNOSIS — E11.9 TYPE 2 DIABETES MELLITUS WITHOUT COMPLICATIONS: ICD-10-CM

## 2024-07-02 DIAGNOSIS — N39.0 URINARY TRACT INFECTION, SITE NOT SPECIFIED: ICD-10-CM

## 2024-07-02 LAB
ALBUMIN SERPL ELPH-MCNC: 4.5 G/DL — SIGNIFICANT CHANGE UP (ref 3.5–5.2)
ALP SERPL-CCNC: 92 U/L — SIGNIFICANT CHANGE UP (ref 30–115)
ALT FLD-CCNC: 16 U/L — SIGNIFICANT CHANGE UP (ref 0–41)
ANION GAP SERPL CALC-SCNC: 13 MMOL/L — SIGNIFICANT CHANGE UP (ref 7–14)
APPEARANCE UR: ABNORMAL
APTT BLD: 27.8 SEC — SIGNIFICANT CHANGE UP (ref 27–39.2)
AST SERPL-CCNC: 16 U/L — SIGNIFICANT CHANGE UP (ref 0–41)
BASOPHILS # BLD AUTO: 0.05 K/UL — SIGNIFICANT CHANGE UP (ref 0–0.2)
BASOPHILS NFR BLD AUTO: 0.3 % — SIGNIFICANT CHANGE UP (ref 0–1)
BILIRUB SERPL-MCNC: 1.5 MG/DL — HIGH (ref 0.2–1.2)
BILIRUB UR-MCNC: ABNORMAL
BUN SERPL-MCNC: 17 MG/DL — SIGNIFICANT CHANGE UP (ref 10–20)
CALCIUM SERPL-MCNC: 9.5 MG/DL — SIGNIFICANT CHANGE UP (ref 8.4–10.5)
CHLORIDE SERPL-SCNC: 96 MMOL/L — LOW (ref 98–110)
CO2 SERPL-SCNC: 26 MMOL/L — SIGNIFICANT CHANGE UP (ref 17–32)
COLOR SPEC: ABNORMAL
CREAT SERPL-MCNC: 0.9 MG/DL — SIGNIFICANT CHANGE UP (ref 0.7–1.5)
DIFF PNL FLD: ABNORMAL
EGFR: 95 ML/MIN/1.73M2 — SIGNIFICANT CHANGE UP
EOSINOPHIL # BLD AUTO: 0.03 K/UL — SIGNIFICANT CHANGE UP (ref 0–0.7)
EOSINOPHIL NFR BLD AUTO: 0.2 % — SIGNIFICANT CHANGE UP (ref 0–8)
GLUCOSE SERPL-MCNC: 235 MG/DL — HIGH (ref 70–99)
GLUCOSE UR QL: 500 MG/DL
HCT VFR BLD CALC: 42.8 % — SIGNIFICANT CHANGE UP (ref 42–52)
HGB BLD-MCNC: 14.7 G/DL — SIGNIFICANT CHANGE UP (ref 14–18)
IMM GRANULOCYTES NFR BLD AUTO: 0.4 % — HIGH (ref 0.1–0.3)
INR BLD: 1.06 RATIO — SIGNIFICANT CHANGE UP (ref 0.65–1.3)
KETONES UR-MCNC: 15 MG/DL
LEUKOCYTE ESTERASE UR-ACNC: ABNORMAL
LYMPHOCYTES # BLD AUTO: 1.64 K/UL — SIGNIFICANT CHANGE UP (ref 1.2–3.4)
LYMPHOCYTES # BLD AUTO: 10.3 % — LOW (ref 20.5–51.1)
MAGNESIUM SERPL-MCNC: 2.2 MG/DL — SIGNIFICANT CHANGE UP (ref 1.8–2.4)
MCHC RBC-ENTMCNC: 30.5 PG — SIGNIFICANT CHANGE UP (ref 27–31)
MCHC RBC-ENTMCNC: 34.3 G/DL — SIGNIFICANT CHANGE UP (ref 32–37)
MCV RBC AUTO: 88.8 FL — SIGNIFICANT CHANGE UP (ref 80–94)
MONOCYTES # BLD AUTO: 0.9 K/UL — HIGH (ref 0.1–0.6)
MONOCYTES NFR BLD AUTO: 5.7 % — SIGNIFICANT CHANGE UP (ref 1.7–9.3)
NEUTROPHILS # BLD AUTO: 13.18 K/UL — HIGH (ref 1.4–6.5)
NEUTROPHILS NFR BLD AUTO: 83.1 % — HIGH (ref 42.2–75.2)
NITRITE UR-MCNC: POSITIVE
NRBC # BLD: 0 /100 WBCS — SIGNIFICANT CHANGE UP (ref 0–0)
PH UR: 5 — SIGNIFICANT CHANGE UP (ref 5–8)
PLATELET # BLD AUTO: 196 K/UL — SIGNIFICANT CHANGE UP (ref 130–400)
PMV BLD: 11.6 FL — HIGH (ref 7.4–10.4)
POTASSIUM SERPL-MCNC: 5 MMOL/L — SIGNIFICANT CHANGE UP (ref 3.5–5)
POTASSIUM SERPL-SCNC: 5 MMOL/L — SIGNIFICANT CHANGE UP (ref 3.5–5)
PROT SERPL-MCNC: 7.3 G/DL — SIGNIFICANT CHANGE UP (ref 6–8)
PROT UR-MCNC: 100 MG/DL
PROTHROM AB SERPL-ACNC: 12.1 SEC — SIGNIFICANT CHANGE UP (ref 9.95–12.87)
RBC # BLD: 4.82 M/UL — SIGNIFICANT CHANGE UP (ref 4.7–6.1)
RBC # FLD: 12.1 % — SIGNIFICANT CHANGE UP (ref 11.5–14.5)
SODIUM SERPL-SCNC: 135 MMOL/L — SIGNIFICANT CHANGE UP (ref 135–146)
SP GR SPEC: 1.03 — SIGNIFICANT CHANGE UP (ref 1–1.03)
UROBILINOGEN FLD QL: 0.2 MG/DL — SIGNIFICANT CHANGE UP (ref 0.2–1)
WBC # BLD: 15.87 K/UL — HIGH (ref 4.8–10.8)
WBC # FLD AUTO: 15.87 K/UL — HIGH (ref 4.8–10.8)

## 2024-07-02 PROCEDURE — 74177 CT ABD & PELVIS W/CONTRAST: CPT | Mod: MC

## 2024-07-02 PROCEDURE — 80053 COMPREHEN METABOLIC PANEL: CPT

## 2024-07-02 PROCEDURE — 99284 EMERGENCY DEPT VISIT MOD MDM: CPT | Mod: 25

## 2024-07-02 PROCEDURE — 86850 RBC ANTIBODY SCREEN: CPT

## 2024-07-02 PROCEDURE — 83735 ASSAY OF MAGNESIUM: CPT

## 2024-07-02 PROCEDURE — 85730 THROMBOPLASTIN TIME PARTIAL: CPT

## 2024-07-02 PROCEDURE — 85610 PROTHROMBIN TIME: CPT

## 2024-07-02 PROCEDURE — 81001 URINALYSIS AUTO W/SCOPE: CPT

## 2024-07-02 PROCEDURE — 36415 COLL VENOUS BLD VENIPUNCTURE: CPT

## 2024-07-02 PROCEDURE — 87186 SC STD MICRODIL/AGAR DIL: CPT

## 2024-07-02 PROCEDURE — 85025 COMPLETE CBC W/AUTO DIFF WBC: CPT

## 2024-07-02 PROCEDURE — 99285 EMERGENCY DEPT VISIT HI MDM: CPT

## 2024-07-02 PROCEDURE — 96374 THER/PROPH/DIAG INJ IV PUSH: CPT | Mod: XU

## 2024-07-02 PROCEDURE — 86900 BLOOD TYPING SEROLOGIC ABO: CPT

## 2024-07-02 PROCEDURE — 74177 CT ABD & PELVIS W/CONTRAST: CPT | Mod: 26,MC

## 2024-07-02 PROCEDURE — 86901 BLOOD TYPING SEROLOGIC RH(D): CPT

## 2024-07-02 PROCEDURE — 87086 URINE CULTURE/COLONY COUNT: CPT

## 2024-07-02 RX ORDER — CEFPODOXIME PROXETIL 50 MG/5 ML
1 SUSPENSION, RECONSTITUTED, ORAL (ML) ORAL
Qty: 14 | Refills: 0
Start: 2024-07-02 | End: 2024-07-08

## 2024-07-02 RX ORDER — DEXTROSE MONOHYDRATE AND SODIUM CHLORIDE 5; .3 G/100ML; G/100ML
1000 INJECTION, SOLUTION INTRAVENOUS ONCE
Refills: 0 | Status: COMPLETED | OUTPATIENT
Start: 2024-07-02 | End: 2024-07-02

## 2024-07-02 RX ORDER — CEFTRIAXONE SODIUM 500 MG
1000 VIAL (EA) INJECTION ONCE
Refills: 0 | Status: COMPLETED | OUTPATIENT
Start: 2024-07-02 | End: 2024-07-02

## 2024-07-02 RX ADMIN — Medication 100 MILLIGRAM(S): at 07:53

## 2024-07-02 RX ADMIN — DEXTROSE MONOHYDRATE AND SODIUM CHLORIDE 1000 MILLILITER(S): 5; .3 INJECTION, SOLUTION INTRAVENOUS at 08:24

## 2024-07-29 ENCOUNTER — APPOINTMENT (OUTPATIENT)
Dept: UROLOGY | Facility: CLINIC | Age: 64
End: 2024-07-29
Payer: COMMERCIAL

## 2024-07-29 VITALS
SYSTOLIC BLOOD PRESSURE: 166 MMHG | HEIGHT: 67 IN | HEART RATE: 67 BPM | BODY MASS INDEX: 32.96 KG/M2 | WEIGHT: 210 LBS | DIASTOLIC BLOOD PRESSURE: 94 MMHG

## 2024-07-29 DIAGNOSIS — R31.0 GROSS HEMATURIA: ICD-10-CM

## 2024-07-29 DIAGNOSIS — N39.0 URINARY TRACT INFECTION, SITE NOT SPECIFIED: ICD-10-CM

## 2024-07-29 DIAGNOSIS — N13.8 BENIGN PROSTATIC HYPERPLASIA WITH LOWER URINARY TRACT SYMPMS: ICD-10-CM

## 2024-07-29 DIAGNOSIS — R31.9 URINARY TRACT INFECTION, SITE NOT SPECIFIED: ICD-10-CM

## 2024-07-29 DIAGNOSIS — N20.0 CALCULUS OF KIDNEY: ICD-10-CM

## 2024-07-29 DIAGNOSIS — N52.9 MALE ERECTILE DYSFUNCTION, UNSPECIFIED: ICD-10-CM

## 2024-07-29 DIAGNOSIS — N40.1 BENIGN PROSTATIC HYPERPLASIA WITH LOWER URINARY TRACT SYMPMS: ICD-10-CM

## 2024-07-29 PROCEDURE — 99204 OFFICE O/P NEW MOD 45 MIN: CPT

## 2024-07-29 PROCEDURE — 81003 URINALYSIS AUTO W/O SCOPE: CPT | Mod: QW

## 2024-07-29 PROCEDURE — G2211 COMPLEX E/M VISIT ADD ON: CPT

## 2024-07-29 RX ORDER — TAMSULOSIN HYDROCHLORIDE 0.4 MG/1
0.4 CAPSULE ORAL
Qty: 90 | Refills: 3 | Status: ACTIVE | COMMUNITY
Start: 2024-07-29 | End: 1900-01-01

## 2024-07-29 NOTE — HISTORY OF PRESENT ILLNESS
[FreeTextEntry1] : JOVANNY BENITO is a 64-year-old male with DM, CAD s/p CABG on asa 81 who presents for consultation for BPH/LUTS and recent urethral bleeding/hematuria during UTI.  Pt presented to [facility] with gross hematuria, urethral bleeding and dysuria. He states this resolved once he left the hospital and finished antibiotic course. Otherwise, he reports good flow and nocturia 1-2x. He is on baby aspirin and takes multivitamins.  Reports he received antibiotics after the emergency room visit and symptoms resolved.   He takes Viagra PRN for chronic ED. Denies flank pain, gross hematuria prior or after or associated symptoms.   CT AP w/ IV Cont 07/02/2024 - images independently reviewed by me - Pt has 9-10 mm RLP and HU 1087. LUP stone 6 mm and . There is a small LLP stone as well. He has circumferential bladder hyperenhancement of left peripheral zone of the prostate. Prostate mildly enlarged and no significant intravesical protrusion. Bladder is distended.  IMPRESSION: Circumferential wall thickening of the nondistended urinary bladder suspicious for cystitis. The prostate is heterogeneous with questionable mass in the left peripheral zone. Urology consultation is recommended. Bilateral nonobstructing renal calculi measuring up to 8 mm.  PSA 07/17/2024 - 2.55  03/2022 - 0.91 02/2019 - 0.99  Labs 07/02/2024 WBC 15.8 Cr 0.9 GFR 95 UA - >1900 RBCs, 379 WBCs, bacteriuria,  UCx - >100,000 CFU/ml Escherichia coli pansensitive    history: Denies recurrent UTIs. No instrumentation. History of nephrolithiasis without intervention, per pt he had a calcium stone.  Family History: denies  malignancies. Social History:  for [employer], no smoking hx

## 2024-07-29 NOTE — ADDENDUM
[FreeTextEntry1] : Patient's note was transcribed with the assistance of a medical scribe under the supervision of Dr. Mason. I, Dr. Mason, have reviewed the patient's chart and agree that it aligns with my medical decisions. Alfreda Del Rio, our scribe, also served as a chaperone for physical examination purposes.

## 2024-07-29 NOTE — ASSESSMENT
[FreeTextEntry1] : JOVANNY BENITO is a 64-year-old male with DM, CAD s/p CABG on asa 81 who presents for consultation for BPH/LUTS and recent urethral bleeding/hematuria during UTI.  Urine culture positive during UTI and CT abdomen pelvis demonstrated prostatitis and cystitis with evidence of nonobstructing bilateral nephrolithiasis right large in the left.   PSA within normal range however not surprisingly elevated compared with prior years in the setting infection.  Will need to repeat in the future - start Flomax for BPH with mild lower urinary tract symptoms given patient already had an infection.  Also recommended diabetes control - UA, UCx + UCyt today to confirm resolution of hematuria - cont Viagra for chronic ED  - f/u 3 months with PSA, KUB + RBUS prior to assess nephrolithiasis.  Can consider shockwave lithotripsy or other procedures in the future for his stones.   Re: alpha blocker-- The patient understands that this medication may cause dizziness, retrograde ejaculation or nasal congestion among other complications. I recommended that the patient take this medication at night. If the side effects become too bothersome, the medication can be discontinued. He does not have any future plans for eye surgery.  He will separate 4 hours from his Flomax

## 2024-08-02 ENCOUNTER — NON-APPOINTMENT (OUTPATIENT)
Age: 64
End: 2024-08-02

## 2024-08-02 LAB
BACTERIA UR CULT: NORMAL
BILIRUB UR QL STRIP: NORMAL
COLLECTION METHOD: NORMAL
GLUCOSE UR-MCNC: NORMAL
HCG UR QL: 1 EU/DL
HGB UR QL STRIP.AUTO: NORMAL
KETONES UR-MCNC: NORMAL
LEUKOCYTE ESTERASE UR QL STRIP: NORMAL
NITRITE UR QL STRIP: NORMAL
PH UR STRIP: 5.5
PROT UR STRIP-MCNC: NORMAL
SP GR UR STRIP: 1.02

## 2024-10-07 ENCOUNTER — RESULT REVIEW (OUTPATIENT)
Age: 64
End: 2024-10-07

## 2024-10-07 ENCOUNTER — OUTPATIENT (OUTPATIENT)
Dept: OUTPATIENT SERVICES | Facility: HOSPITAL | Age: 64
LOS: 1 days | Discharge: HOME | End: 2024-10-07
Payer: COMMERCIAL

## 2024-10-07 DIAGNOSIS — Z98.890 OTHER SPECIFIED POSTPROCEDURAL STATES: Chronic | ICD-10-CM

## 2024-10-07 DIAGNOSIS — N20.0 CALCULUS OF KIDNEY: ICD-10-CM

## 2024-10-07 DIAGNOSIS — Z00.8 ENCOUNTER FOR OTHER GENERAL EXAMINATION: ICD-10-CM

## 2024-10-07 PROCEDURE — 76770 US EXAM ABDO BACK WALL COMP: CPT

## 2024-10-07 PROCEDURE — 74019 RADEX ABDOMEN 2 VIEWS: CPT

## 2024-10-07 PROCEDURE — 74019 RADEX ABDOMEN 2 VIEWS: CPT | Mod: 26

## 2024-10-07 PROCEDURE — 76770 US EXAM ABDO BACK WALL COMP: CPT | Mod: 26

## 2024-10-08 DIAGNOSIS — N20.0 CALCULUS OF KIDNEY: ICD-10-CM

## 2024-10-21 ENCOUNTER — LABORATORY RESULT (OUTPATIENT)
Age: 64
End: 2024-10-21

## 2024-10-21 ENCOUNTER — APPOINTMENT (OUTPATIENT)
Dept: UROLOGY | Facility: CLINIC | Age: 64
End: 2024-10-21
Payer: COMMERCIAL

## 2024-10-21 VITALS
HEIGHT: 67 IN | HEART RATE: 65 BPM | SYSTOLIC BLOOD PRESSURE: 167 MMHG | WEIGHT: 210 LBS | RESPIRATION RATE: 18 BRPM | TEMPERATURE: 97.6 F | DIASTOLIC BLOOD PRESSURE: 82 MMHG | OXYGEN SATURATION: 98 % | BODY MASS INDEX: 32.96 KG/M2

## 2024-10-21 DIAGNOSIS — R31.0 GROSS HEMATURIA: ICD-10-CM

## 2024-10-21 DIAGNOSIS — N40.1 BENIGN PROSTATIC HYPERPLASIA WITH LOWER URINARY TRACT SYMPMS: ICD-10-CM

## 2024-10-21 DIAGNOSIS — N13.8 BENIGN PROSTATIC HYPERPLASIA WITH LOWER URINARY TRACT SYMPMS: ICD-10-CM

## 2024-10-21 DIAGNOSIS — N20.0 CALCULUS OF KIDNEY: ICD-10-CM

## 2024-10-21 LAB
BILIRUB UR QL STRIP: NORMAL
CLARITY UR: CLEAR
COLLECTION METHOD: NORMAL
GLUCOSE UR-MCNC: NORMAL
HCG UR QL: 1 EU/DL
HGB UR QL STRIP.AUTO: NORMAL
KETONES UR-MCNC: NORMAL
LEUKOCYTE ESTERASE UR QL STRIP: NORMAL
NITRITE UR QL STRIP: NORMAL
PH UR STRIP: 6
PROT UR STRIP-MCNC: NORMAL
SP GR UR STRIP: 1.02

## 2024-10-21 PROCEDURE — 99215 OFFICE O/P EST HI 40 MIN: CPT

## 2024-10-21 PROCEDURE — 81003 URINALYSIS AUTO W/O SCOPE: CPT | Mod: QW

## 2024-10-21 PROCEDURE — G2211 COMPLEX E/M VISIT ADD ON: CPT

## 2024-10-22 LAB — URINE CYTOLOGY: NORMAL

## 2024-12-18 ENCOUNTER — APPOINTMENT (OUTPATIENT)
Dept: CARDIOLOGY | Facility: CLINIC | Age: 64
End: 2024-12-18
Payer: COMMERCIAL

## 2024-12-18 ENCOUNTER — NON-APPOINTMENT (OUTPATIENT)
Age: 64
End: 2024-12-18

## 2024-12-18 VITALS
HEART RATE: 71 BPM | OXYGEN SATURATION: 96 % | DIASTOLIC BLOOD PRESSURE: 70 MMHG | RESPIRATION RATE: 16 BRPM | BODY MASS INDEX: 35.63 KG/M2 | WEIGHT: 227 LBS | HEIGHT: 67 IN | SYSTOLIC BLOOD PRESSURE: 122 MMHG

## 2024-12-18 DIAGNOSIS — I10 ESSENTIAL (PRIMARY) HYPERTENSION: ICD-10-CM

## 2024-12-18 DIAGNOSIS — Z01.818 ENCOUNTER FOR OTHER PREPROCEDURAL EXAMINATION: ICD-10-CM

## 2024-12-18 DIAGNOSIS — I25.10 ATHEROSCLEROTIC HEART DISEASE OF NATIVE CORONARY ARTERY W/OUT ANGINA PECTORIS: ICD-10-CM

## 2024-12-18 DIAGNOSIS — E66.9 OBESITY, UNSPECIFIED: ICD-10-CM

## 2024-12-18 DIAGNOSIS — E78.5 HYPERLIPIDEMIA, UNSPECIFIED: ICD-10-CM

## 2024-12-18 PROCEDURE — 93000 ELECTROCARDIOGRAM COMPLETE: CPT

## 2024-12-18 PROCEDURE — 93306 TTE W/DOPPLER COMPLETE: CPT

## 2024-12-18 PROCEDURE — 99214 OFFICE O/P EST MOD 30 MIN: CPT

## 2024-12-18 PROCEDURE — G2211 COMPLEX E/M VISIT ADD ON: CPT

## 2024-12-26 ENCOUNTER — OUTPATIENT (OUTPATIENT)
Dept: OUTPATIENT SERVICES | Facility: HOSPITAL | Age: 64
LOS: 1 days | End: 2024-12-26
Payer: COMMERCIAL

## 2024-12-26 VITALS
SYSTOLIC BLOOD PRESSURE: 159 MMHG | DIASTOLIC BLOOD PRESSURE: 85 MMHG | TEMPERATURE: 98 F | OXYGEN SATURATION: 98 % | HEART RATE: 68 BPM | RESPIRATION RATE: 18 BRPM

## 2024-12-26 DIAGNOSIS — Z01.818 ENCOUNTER FOR OTHER PREPROCEDURAL EXAMINATION: ICD-10-CM

## 2024-12-26 DIAGNOSIS — N20.0 CALCULUS OF KIDNEY: ICD-10-CM

## 2024-12-26 DIAGNOSIS — Z98.890 OTHER SPECIFIED POSTPROCEDURAL STATES: Chronic | ICD-10-CM

## 2024-12-26 DIAGNOSIS — Z95.1 PRESENCE OF AORTOCORONARY BYPASS GRAFT: Chronic | ICD-10-CM

## 2024-12-26 LAB
ALBUMIN SERPL ELPH-MCNC: 4.5 G/DL — SIGNIFICANT CHANGE UP (ref 3.5–5.2)
ALP SERPL-CCNC: 98 U/L — SIGNIFICANT CHANGE UP (ref 30–115)
ALT FLD-CCNC: 23 U/L — SIGNIFICANT CHANGE UP (ref 0–41)
ANION GAP SERPL CALC-SCNC: 11 MMOL/L — SIGNIFICANT CHANGE UP (ref 7–14)
APPEARANCE UR: CLEAR — SIGNIFICANT CHANGE UP
AST SERPL-CCNC: 22 U/L — SIGNIFICANT CHANGE UP (ref 0–41)
BASOPHILS # BLD AUTO: 0.05 K/UL — SIGNIFICANT CHANGE UP (ref 0–0.2)
BASOPHILS NFR BLD AUTO: 0.6 % — SIGNIFICANT CHANGE UP (ref 0–1)
BILIRUB SERPL-MCNC: 0.7 MG/DL — SIGNIFICANT CHANGE UP (ref 0.2–1.2)
BILIRUB UR-MCNC: NEGATIVE — SIGNIFICANT CHANGE UP
BUN SERPL-MCNC: 22 MG/DL — HIGH (ref 10–20)
CALCIUM SERPL-MCNC: 10.1 MG/DL — SIGNIFICANT CHANGE UP (ref 8.4–10.5)
CHLORIDE SERPL-SCNC: 102 MMOL/L — SIGNIFICANT CHANGE UP (ref 98–110)
CO2 SERPL-SCNC: 25 MMOL/L — SIGNIFICANT CHANGE UP (ref 17–32)
COLOR SPEC: YELLOW — SIGNIFICANT CHANGE UP
CREAT SERPL-MCNC: 0.9 MG/DL — SIGNIFICANT CHANGE UP (ref 0.7–1.5)
DIFF PNL FLD: NEGATIVE — SIGNIFICANT CHANGE UP
EGFR: 95 ML/MIN/1.73M2 — SIGNIFICANT CHANGE UP
EOSINOPHIL # BLD AUTO: 0.08 K/UL — SIGNIFICANT CHANGE UP (ref 0–0.7)
EOSINOPHIL NFR BLD AUTO: 1 % — SIGNIFICANT CHANGE UP (ref 0–8)
GLUCOSE SERPL-MCNC: 241 MG/DL — HIGH (ref 70–99)
GLUCOSE UR QL: >=1000 MG/DL
HCT VFR BLD CALC: 43.8 % — SIGNIFICANT CHANGE UP (ref 42–52)
HGB BLD-MCNC: 14.7 G/DL — SIGNIFICANT CHANGE UP (ref 14–18)
IMM GRANULOCYTES NFR BLD AUTO: 0.5 % — HIGH (ref 0.1–0.3)
KETONES UR-MCNC: NEGATIVE MG/DL — SIGNIFICANT CHANGE UP
LEUKOCYTE ESTERASE UR-ACNC: NEGATIVE — SIGNIFICANT CHANGE UP
LYMPHOCYTES # BLD AUTO: 2.17 K/UL — SIGNIFICANT CHANGE UP (ref 1.2–3.4)
LYMPHOCYTES # BLD AUTO: 28.1 % — SIGNIFICANT CHANGE UP (ref 20.5–51.1)
MCHC RBC-ENTMCNC: 29.8 PG — SIGNIFICANT CHANGE UP (ref 27–31)
MCHC RBC-ENTMCNC: 33.6 G/DL — SIGNIFICANT CHANGE UP (ref 32–37)
MCV RBC AUTO: 88.8 FL — SIGNIFICANT CHANGE UP (ref 80–94)
MONOCYTES # BLD AUTO: 0.53 K/UL — SIGNIFICANT CHANGE UP (ref 0.1–0.6)
MONOCYTES NFR BLD AUTO: 6.9 % — SIGNIFICANT CHANGE UP (ref 1.7–9.3)
NEUTROPHILS # BLD AUTO: 4.86 K/UL — SIGNIFICANT CHANGE UP (ref 1.4–6.5)
NEUTROPHILS NFR BLD AUTO: 62.9 % — SIGNIFICANT CHANGE UP (ref 42.2–75.2)
NITRITE UR-MCNC: NEGATIVE — SIGNIFICANT CHANGE UP
NRBC # BLD: 0 /100 WBCS — SIGNIFICANT CHANGE UP (ref 0–0)
PH UR: 5.5 — SIGNIFICANT CHANGE UP (ref 5–8)
PLATELET # BLD AUTO: 209 K/UL — SIGNIFICANT CHANGE UP (ref 130–400)
PMV BLD: 12.3 FL — HIGH (ref 7.4–10.4)
POTASSIUM SERPL-MCNC: 4.4 MMOL/L — SIGNIFICANT CHANGE UP (ref 3.5–5)
POTASSIUM SERPL-SCNC: 4.4 MMOL/L — SIGNIFICANT CHANGE UP (ref 3.5–5)
PROT SERPL-MCNC: 7.3 G/DL — SIGNIFICANT CHANGE UP (ref 6–8)
PROT UR-MCNC: NEGATIVE MG/DL — SIGNIFICANT CHANGE UP
RBC # BLD: 4.93 M/UL — SIGNIFICANT CHANGE UP (ref 4.7–6.1)
RBC # FLD: 12.1 % — SIGNIFICANT CHANGE UP (ref 11.5–14.5)
SODIUM SERPL-SCNC: 138 MMOL/L — SIGNIFICANT CHANGE UP (ref 135–146)
SP GR SPEC: 1.03 — SIGNIFICANT CHANGE UP (ref 1–1.03)
UROBILINOGEN FLD QL: 1 MG/DL — SIGNIFICANT CHANGE UP (ref 0.2–1)
WBC # BLD: 7.73 K/UL — SIGNIFICANT CHANGE UP (ref 4.8–10.8)
WBC # FLD AUTO: 7.73 K/UL — SIGNIFICANT CHANGE UP (ref 4.8–10.8)

## 2024-12-26 PROCEDURE — 99214 OFFICE O/P EST MOD 30 MIN: CPT | Mod: 25

## 2024-12-26 PROCEDURE — 87086 URINE CULTURE/COLONY COUNT: CPT

## 2024-12-26 PROCEDURE — 85025 COMPLETE CBC W/AUTO DIFF WBC: CPT

## 2024-12-26 PROCEDURE — 36415 COLL VENOUS BLD VENIPUNCTURE: CPT

## 2024-12-26 PROCEDURE — 80053 COMPREHEN METABOLIC PANEL: CPT

## 2024-12-26 PROCEDURE — 81003 URINALYSIS AUTO W/O SCOPE: CPT

## 2024-12-26 RX ORDER — ASPIRIN 81 MG
0 TABLET, DELAYED RELEASE (ENTERIC COATED) ORAL
Refills: 0 | DISCHARGE

## 2024-12-26 NOTE — H&P PST ADULT - REASON FOR ADMISSION
Case Type: OP Block TimeSuite: [facility] Arden [facility]  Confirmed Surgery DateTime: 01- - 0:00PAST DateTime: 12- - 12:15Procedure: RIGHT EXTRACORPOREAL SHOCK WAVE LITHOTRIPSY  ERP?: NoLaterality: RightLength of Procedure: 30 Minutes  Anesthesia Type: General

## 2024-12-26 NOTE — H&P PST ADULT - HISTORY OF PRESENT ILLNESS
PATIENT CURRENTLY DENIES CHEST PAIN  SHORTNESS OF BREATH  PALPITATIONS,  DYSURIA, OR UPPER RESPIRATORY INFECTION IN PAST 2 WEEKS  PATIENT STATED THAT HE HAS STONES THAT NEED TO BE REMOVED.    Denies travel outside the [place] in the past 30 days  Patient denies any signs or symptoms of COVID 19 and denies contact with known positive individuals.         Anesthesia Alert  YES--Difficult Airway  CLASS IV  NO--History of neck surgery or radiation  NO--Limited ROM of neck  NO--History of Malignant hyperthermia  NO--No personal or family history of Pseudocholinesterase deficiency.  NO--Prior Anesthesia Complication  NO--Latex Allergy  NO--Loose teeth  NO--History of Rheumatoid Arthritis  NO--Bleeding risk  NO--JANIS  NO--Other_____    DASI  9.89    RCRI  1    PT DENIES ANY RASHES, ABRASION, OR OPEN WOUNDS OR CUTS    AS PER THE PT, THIS IS HIS/HER COMPLETE MEDICAL AND SURGICAL HX, INCLUDING MEDICATIONS PRESCRIBED AND OVER THE COUNTER    Patient/Guardian understands the instructions and was given the opportunity to ask questions and have them answered.    pt denies any suicidal ideation or thoughts, pt states not a threat to self or others

## 2024-12-26 NOTE — H&P PST ADULT - NSICDXPASTSURGICALHX_GEN_ALL_CORE_FT
PAST SURGICAL HISTORY:  H/O heart bypass surgery     History of tonsillectomy and adenoidectomy

## 2024-12-27 DIAGNOSIS — Z01.818 ENCOUNTER FOR OTHER PREPROCEDURAL EXAMINATION: ICD-10-CM

## 2024-12-27 DIAGNOSIS — N20.0 CALCULUS OF KIDNEY: ICD-10-CM

## 2024-12-27 LAB
CULTURE RESULTS: NO GROWTH — SIGNIFICANT CHANGE UP
SPECIMEN SOURCE: SIGNIFICANT CHANGE UP

## 2025-01-09 NOTE — REASON FOR VISIT
Left message on patient's voicemail to call office back   [Other: ____] : [unfilled] [FreeTextEntry1] : routine follow up pt completed cardiac rehab

## 2025-01-10 ENCOUNTER — OUTPATIENT (OUTPATIENT)
Dept: OUTPATIENT SERVICES | Facility: HOSPITAL | Age: 65
LOS: 1 days | Discharge: HOME | End: 2025-01-10
Payer: COMMERCIAL

## 2025-01-10 ENCOUNTER — TRANSCRIPTION ENCOUNTER (OUTPATIENT)
Age: 65
End: 2025-01-10

## 2025-01-10 ENCOUNTER — APPOINTMENT (OUTPATIENT)
Dept: UROLOGY | Facility: HOSPITAL | Age: 65
End: 2025-01-10

## 2025-01-10 VITALS — SYSTOLIC BLOOD PRESSURE: 123 MMHG | HEART RATE: 64 BPM | DIASTOLIC BLOOD PRESSURE: 68 MMHG | RESPIRATION RATE: 17 BRPM

## 2025-01-10 VITALS
OXYGEN SATURATION: 99 % | TEMPERATURE: 98 F | HEART RATE: 68 BPM | RESPIRATION RATE: 18 BRPM | SYSTOLIC BLOOD PRESSURE: 155 MMHG | DIASTOLIC BLOOD PRESSURE: 76 MMHG | WEIGHT: 225.09 LBS | HEIGHT: 67 IN

## 2025-01-10 DIAGNOSIS — N20.0 CALCULUS OF KIDNEY: ICD-10-CM

## 2025-01-10 DIAGNOSIS — Z98.890 OTHER SPECIFIED POSTPROCEDURAL STATES: Chronic | ICD-10-CM

## 2025-01-10 DIAGNOSIS — Z95.1 PRESENCE OF AORTOCORONARY BYPASS GRAFT: Chronic | ICD-10-CM

## 2025-01-10 LAB — GLUCOSE BLDC GLUCOMTR-MCNC: 219 MG/DL — HIGH (ref 70–99)

## 2025-01-10 PROCEDURE — 50590 FRAGMENTING OF KIDNEY STONE: CPT | Mod: RT

## 2025-01-10 PROCEDURE — 82962 GLUCOSE BLOOD TEST: CPT

## 2025-01-10 PROCEDURE — 74018 RADEX ABDOMEN 1 VIEW: CPT

## 2025-01-10 PROCEDURE — 74018 RADEX ABDOMEN 1 VIEW: CPT | Mod: 26

## 2025-01-10 RX ORDER — ASPIRIN 81 MG
81 TABLET, DELAYED RELEASE (ENTERIC COATED) ORAL
Refills: 0 | DISCHARGE

## 2025-01-10 RX ORDER — SODIUM CHLORIDE 9 MG/ML
1000 INJECTION, SOLUTION INTRAVENOUS
Refills: 0 | Status: DISCONTINUED | OUTPATIENT
Start: 2025-01-10 | End: 2025-01-10

## 2025-01-10 RX ORDER — MORPHINE SULFATE 15 MG
2 TABLET, EXTENDED RELEASE ORAL
Refills: 0 | Status: DISCONTINUED | OUTPATIENT
Start: 2025-01-10 | End: 2025-01-10

## 2025-01-10 RX ORDER — ONDANSETRON 4 MG/1
4 TABLET ORAL ONCE
Refills: 0 | Status: DISCONTINUED | OUTPATIENT
Start: 2025-01-10 | End: 2025-01-10

## 2025-01-10 RX ORDER — HYDROMORPHONE HCL 4 MG
0.5 TABLET ORAL
Refills: 0 | Status: DISCONTINUED | OUTPATIENT
Start: 2025-01-10 | End: 2025-01-10

## 2025-01-10 RX ORDER — ACETAMINOPHEN 80 MG/.8ML
650 SOLUTION/ DROPS ORAL ONCE
Refills: 0 | Status: DISCONTINUED | OUTPATIENT
Start: 2025-01-10 | End: 2025-01-10

## 2025-01-10 RX ORDER — GLYBURIDE 3 MG/1
1 TABLET ORAL
Refills: 0 | DISCHARGE

## 2025-01-10 RX ORDER — TAMSULOSIN HYDROCHLORIDE 0.4 MG/1
1 CAPSULE ORAL
Refills: 0 | DISCHARGE

## 2025-01-10 NOTE — ASU PATIENT PROFILE, ADULT - FALL HARM RISK - HARM RISK INTERVENTIONS

## 2025-01-10 NOTE — ASU DISCHARGE PLAN (ADULT/PEDIATRIC) - FINANCIAL ASSISTANCE
[facility] provides services at a reduced cost to those who are determined to be eligible through [facility]’s financial assistance program. Information regarding [facility]’s financial assistance program can be found by going to [email or web address] or by calling [phone].

## 2025-01-10 NOTE — ASU DISCHARGE PLAN (ADULT/PEDIATRIC) - ASU DC SPECIAL INSTRUCTIONSFT
GENERAL: It is common to have blood in your urine after your procedure. It may be pink or even red; and it is important to increase fluid intake to 2-3L of water per day to keep the urine as clear as possible. Please inform your doctor if you have a significant amount of clot in the urine or if you are unable to void at all. The urine may clear and then become bloody again especially as you are more physically active.    PAIN: You may take Tylenol (acetaminophen) 650-975mg and/or Motrin (ibuprofen) 400-600mg, both available over the counter, for pain every 6 hours as needed. Do not exceed 4000mg of Tylenol (acetaminophen) daily. You may alternate these medications such that you take one or the other every 3 hours for around the clock pain coverage.     ANTIBIOTICS: none    FOLLOW-UP: Dr Mason's office will call to schedule follow up

## 2025-01-10 NOTE — ASU DISCHARGE PLAN (ADULT/PEDIATRIC) - NS MD DC FALL RISK RISK
For information on Fall & Injury Prevention, visit: [email or web address] OR  [email or web address] OR  https://www.cdc.gov/steadi/patient.html

## 2025-01-13 ENCOUNTER — NON-APPOINTMENT (OUTPATIENT)
Age: 65
End: 2025-01-13

## 2025-01-13 PROBLEM — N20.0 CALCULUS OF KIDNEY: Chronic | Status: ACTIVE | Noted: 2025-01-10

## 2025-01-14 DIAGNOSIS — Z79.84 LONG TERM (CURRENT) USE OF ORAL HYPOGLYCEMIC DRUGS: ICD-10-CM

## 2025-01-14 DIAGNOSIS — N20.0 CALCULUS OF KIDNEY: ICD-10-CM

## 2025-01-14 DIAGNOSIS — E11.9 TYPE 2 DIABETES MELLITUS WITHOUT COMPLICATIONS: ICD-10-CM

## 2025-01-14 DIAGNOSIS — E78.5 HYPERLIPIDEMIA, UNSPECIFIED: ICD-10-CM

## 2025-01-14 DIAGNOSIS — Z79.82 LONG TERM (CURRENT) USE OF ASPIRIN: ICD-10-CM

## 2025-01-15 ENCOUNTER — RESULT REVIEW (OUTPATIENT)
Age: 65
End: 2025-01-15

## 2025-01-15 ENCOUNTER — OUTPATIENT (OUTPATIENT)
Dept: OUTPATIENT SERVICES | Facility: HOSPITAL | Age: 65
LOS: 1 days | End: 2025-01-15
Payer: COMMERCIAL

## 2025-01-15 DIAGNOSIS — Z98.890 OTHER SPECIFIED POSTPROCEDURAL STATES: Chronic | ICD-10-CM

## 2025-01-15 DIAGNOSIS — N20.0 CALCULUS OF KIDNEY: ICD-10-CM

## 2025-01-15 DIAGNOSIS — Z95.1 PRESENCE OF AORTOCORONARY BYPASS GRAFT: Chronic | ICD-10-CM

## 2025-01-15 PROCEDURE — 74019 RADEX ABDOMEN 2 VIEWS: CPT | Mod: 26

## 2025-01-15 PROCEDURE — 74019 RADEX ABDOMEN 2 VIEWS: CPT

## 2025-01-16 DIAGNOSIS — N20.0 CALCULUS OF KIDNEY: ICD-10-CM

## 2025-02-04 ENCOUNTER — APPOINTMENT (OUTPATIENT)
Dept: UROLOGY | Facility: CLINIC | Age: 65
End: 2025-02-04
Payer: COMMERCIAL

## 2025-02-04 VITALS
HEIGHT: 67 IN | BODY MASS INDEX: 35.63 KG/M2 | SYSTOLIC BLOOD PRESSURE: 137 MMHG | HEART RATE: 70 BPM | DIASTOLIC BLOOD PRESSURE: 79 MMHG | OXYGEN SATURATION: 96 % | TEMPERATURE: 98.6 F | WEIGHT: 227 LBS

## 2025-02-04 DIAGNOSIS — N40.1 BENIGN PROSTATIC HYPERPLASIA WITH LOWER URINARY TRACT SYMPMS: ICD-10-CM

## 2025-02-04 DIAGNOSIS — Z87.442 PERSONAL HISTORY OF URINARY CALCULI: ICD-10-CM

## 2025-02-04 DIAGNOSIS — N13.8 BENIGN PROSTATIC HYPERPLASIA WITH LOWER URINARY TRACT SYMPMS: ICD-10-CM

## 2025-02-04 DIAGNOSIS — N20.0 CALCULUS OF KIDNEY: ICD-10-CM

## 2025-02-04 PROCEDURE — G2211 COMPLEX E/M VISIT ADD ON: CPT

## 2025-02-04 PROCEDURE — 99214 OFFICE O/P EST MOD 30 MIN: CPT | Mod: 24

## 2025-02-13 ENCOUNTER — NON-APPOINTMENT (OUTPATIENT)
Age: 65
End: 2025-02-13

## 2025-02-13 LAB
KIDNEY STONE SOURCE: NORMAL
NIDUS STONE QN: NORMAL
RESULT COMMENT: NORMAL

## 2025-03-31 NOTE — PATIENT PROFILE ADULT - NSASFALLNEEDSASSISTWITH_GEN_A_NUR
standing/walking/toileting Imiquimod Counseling:  I discussed with the patient the risks of imiquimod including but not limited to erythema, scaling, itching, weeping, crusting, and pain.  Patient understands that the inflammatory response to imiquimod is variable from person to person and was educated regarded proper titration schedule.  If flu-like symptoms develop, patient knows to discontinue the medication and contact us.

## 2025-06-04 ENCOUNTER — NON-APPOINTMENT (OUTPATIENT)
Age: 65
End: 2025-06-04

## 2025-06-04 ENCOUNTER — APPOINTMENT (OUTPATIENT)
Dept: CARDIOLOGY | Facility: CLINIC | Age: 65
End: 2025-06-04
Payer: COMMERCIAL

## 2025-06-04 VITALS
HEIGHT: 67 IN | WEIGHT: 224 LBS | BODY MASS INDEX: 35.16 KG/M2 | DIASTOLIC BLOOD PRESSURE: 80 MMHG | RESPIRATION RATE: 16 BRPM | HEART RATE: 68 BPM | OXYGEN SATURATION: 98 % | SYSTOLIC BLOOD PRESSURE: 134 MMHG

## 2025-06-04 DIAGNOSIS — E66.9 OBESITY, UNSPECIFIED: ICD-10-CM

## 2025-06-04 DIAGNOSIS — I10 ESSENTIAL (PRIMARY) HYPERTENSION: ICD-10-CM

## 2025-06-04 DIAGNOSIS — I25.10 ATHEROSCLEROTIC HEART DISEASE OF NATIVE CORONARY ARTERY W/OUT ANGINA PECTORIS: ICD-10-CM

## 2025-06-04 DIAGNOSIS — E78.5 HYPERLIPIDEMIA, UNSPECIFIED: ICD-10-CM

## 2025-06-04 DIAGNOSIS — E11.9 TYPE 2 DIABETES MELLITUS W/OUT COMPLICATIONS: ICD-10-CM

## 2025-06-04 PROCEDURE — 93000 ELECTROCARDIOGRAM COMPLETE: CPT

## 2025-06-04 PROCEDURE — 99214 OFFICE O/P EST MOD 30 MIN: CPT

## 2025-06-04 PROCEDURE — G2211 COMPLEX E/M VISIT ADD ON: CPT

## 2025-07-18 ENCOUNTER — RX RENEWAL (OUTPATIENT)
Age: 65
End: 2025-07-18

## 2025-07-28 ENCOUNTER — RESULT REVIEW (OUTPATIENT)
Age: 65
End: 2025-07-28

## 2025-07-28 ENCOUNTER — OUTPATIENT (OUTPATIENT)
Dept: OUTPATIENT SERVICES | Facility: HOSPITAL | Age: 65
LOS: 1 days | End: 2025-07-28
Payer: COMMERCIAL

## 2025-07-28 DIAGNOSIS — Z98.890 OTHER SPECIFIED POSTPROCEDURAL STATES: Chronic | ICD-10-CM

## 2025-07-28 DIAGNOSIS — N20.0 CALCULUS OF KIDNEY: ICD-10-CM

## 2025-07-28 DIAGNOSIS — Z95.1 PRESENCE OF AORTOCORONARY BYPASS GRAFT: Chronic | ICD-10-CM

## 2025-07-28 PROCEDURE — 74019 RADEX ABDOMEN 2 VIEWS: CPT

## 2025-07-28 PROCEDURE — 74019 RADEX ABDOMEN 2 VIEWS: CPT | Mod: 26

## 2025-08-05 ENCOUNTER — APPOINTMENT (OUTPATIENT)
Dept: UROLOGY | Facility: CLINIC | Age: 65
End: 2025-08-05
Payer: COMMERCIAL

## 2025-08-05 VITALS
OXYGEN SATURATION: 95 % | SYSTOLIC BLOOD PRESSURE: 154 MMHG | HEART RATE: 65 BPM | HEIGHT: 67 IN | BODY MASS INDEX: 34.37 KG/M2 | DIASTOLIC BLOOD PRESSURE: 80 MMHG | WEIGHT: 219 LBS

## 2025-08-05 DIAGNOSIS — N20.1 CALCULUS OF URETER: ICD-10-CM

## 2025-08-05 DIAGNOSIS — N40.1 BENIGN PROSTATIC HYPERPLASIA WITH LOWER URINARY TRACT SYMPMS: ICD-10-CM

## 2025-08-05 DIAGNOSIS — N52.9 MALE ERECTILE DYSFUNCTION, UNSPECIFIED: ICD-10-CM

## 2025-08-05 DIAGNOSIS — N13.8 BENIGN PROSTATIC HYPERPLASIA WITH LOWER URINARY TRACT SYMPMS: ICD-10-CM

## 2025-08-05 DIAGNOSIS — N20.0 CALCULUS OF KIDNEY: ICD-10-CM

## 2025-08-05 DIAGNOSIS — R82.993 HYPERURICOSURIA: ICD-10-CM

## 2025-08-05 PROCEDURE — G2211 COMPLEX E/M VISIT ADD ON: CPT

## 2025-08-05 PROCEDURE — 99214 OFFICE O/P EST MOD 30 MIN: CPT

## 2025-09-09 ENCOUNTER — RESULT REVIEW (OUTPATIENT)
Age: 65
End: 2025-09-09

## 2025-09-15 ENCOUNTER — NON-APPOINTMENT (OUTPATIENT)
Age: 65
End: 2025-09-15

## 2025-09-16 ENCOUNTER — APPOINTMENT (OUTPATIENT)
Dept: UROLOGY | Facility: CLINIC | Age: 65
End: 2025-09-16
Payer: COMMERCIAL

## 2025-09-16 VITALS
BODY MASS INDEX: 34.37 KG/M2 | RESPIRATION RATE: 18 BRPM | DIASTOLIC BLOOD PRESSURE: 76 MMHG | HEART RATE: 64 BPM | TEMPERATURE: 98 F | SYSTOLIC BLOOD PRESSURE: 126 MMHG | WEIGHT: 219 LBS | HEIGHT: 67 IN | OXYGEN SATURATION: 96 %

## 2025-09-16 DIAGNOSIS — R82.993 HYPERURICOSURIA: ICD-10-CM

## 2025-09-16 DIAGNOSIS — N40.1 BENIGN PROSTATIC HYPERPLASIA WITH LOWER URINARY TRACT SYMPMS: ICD-10-CM

## 2025-09-16 DIAGNOSIS — N20.0 CALCULUS OF KIDNEY: ICD-10-CM

## 2025-09-16 DIAGNOSIS — N13.8 BENIGN PROSTATIC HYPERPLASIA WITH LOWER URINARY TRACT SYMPMS: ICD-10-CM

## 2025-09-16 PROCEDURE — G2211 COMPLEX E/M VISIT ADD ON: CPT

## 2025-09-16 PROCEDURE — 99214 OFFICE O/P EST MOD 30 MIN: CPT
